# Patient Record
Sex: FEMALE | Race: WHITE | Employment: UNEMPLOYED | URBAN - METROPOLITAN AREA
[De-identification: names, ages, dates, MRNs, and addresses within clinical notes are randomized per-mention and may not be internally consistent; named-entity substitution may affect disease eponyms.]

---

## 2019-09-01 ENCOUNTER — APPOINTMENT (OUTPATIENT)
Dept: ULTRASOUND IMAGING | Age: 54
End: 2019-09-01
Payer: COMMERCIAL

## 2019-09-01 ENCOUNTER — HOSPITAL ENCOUNTER (EMERGENCY)
Age: 54
Discharge: HOME | End: 2019-09-01
Payer: COMMERCIAL

## 2019-09-01 ENCOUNTER — APPOINTMENT (OUTPATIENT)
Dept: GENERAL RADIOLOGY | Age: 54
End: 2019-09-01
Payer: COMMERCIAL

## 2019-09-01 VITALS
OXYGEN SATURATION: 95 % | TEMPERATURE: 97.6 F | SYSTOLIC BLOOD PRESSURE: 123 MMHG | BODY MASS INDEX: 38.09 KG/M2 | WEIGHT: 215 LBS | HEIGHT: 63 IN | DIASTOLIC BLOOD PRESSURE: 111 MMHG | HEART RATE: 94 BPM | RESPIRATION RATE: 16 BRPM

## 2019-09-01 DIAGNOSIS — M25.561 ACUTE PAIN OF RIGHT KNEE: Primary | ICD-10-CM

## 2019-09-01 DIAGNOSIS — M79.661 RIGHT CALF PAIN: ICD-10-CM

## 2019-09-01 PROCEDURE — 93971 EXTREMITY STUDY: CPT

## 2019-09-01 PROCEDURE — 73560 X-RAY EXAM OF KNEE 1 OR 2: CPT

## 2019-09-01 PROCEDURE — 6370000000 HC RX 637 (ALT 250 FOR IP): Performed by: PHYSICIAN ASSISTANT

## 2019-09-01 PROCEDURE — 99284 EMERGENCY DEPT VISIT MOD MDM: CPT

## 2019-09-01 RX ORDER — HYDROCODONE BITARTRATE AND ACETAMINOPHEN 5; 325 MG/1; MG/1
1 TABLET ORAL ONCE
Status: COMPLETED | OUTPATIENT
Start: 2019-09-01 | End: 2019-09-01

## 2019-09-01 RX ADMIN — HYDROCODONE BITARTRATE AND ACETAMINOPHEN 1 TABLET: 5; 325 TABLET ORAL at 15:11

## 2019-09-01 ASSESSMENT — PAIN DESCRIPTION - ORIENTATION: ORIENTATION: RIGHT

## 2019-09-01 ASSESSMENT — PAIN DESCRIPTION - LOCATION: LOCATION: KNEE

## 2019-09-01 ASSESSMENT — PAIN SCALES - GENERAL
PAINLEVEL_OUTOF10: 8
PAINLEVEL_OUTOF10: 8

## 2019-09-01 ASSESSMENT — PAIN DESCRIPTION - PAIN TYPE: TYPE: ACUTE PAIN

## 2019-09-01 NOTE — ED PROVIDER NOTES
Take 1 tablet by mouth every 6 hours as needed for Pain or Fever 60 tablet 0    ondansetron (ZOFRAN ODT) 4 MG disintegrating tablet Take 1 tablet by mouth every 8 hours as needed for Nausea 15 tablet 0    famotidine (PEPCID) 20 MG tablet Take 1 tablet by mouth 2 times daily 60 tablet 3    omeprazole (PRILOSEC) 20 MG delayed release capsule Take 1 capsule by mouth daily 30 capsule 0    dicyclomine (BENTYL) 10 MG capsule Take 1 capsule by mouth 3 times daily As needed for abdominal pain 15 capsule 3    Compression Stockings MISC by Does not apply route Do not wear for more than 8 hours per day. Please fit and supply thigh high compression stockings 1 each 0    HYDROcodone-acetaminophen (NORCO) 5-325 MG per tablet Take 1 tablet by mouth every 6 hours as needed for Pain 15 tablet 0    gabapentin (NEURONTIN) 300 MG capsule Take two tablets po tid (Patient taking differently: 400 mg Take two tablets po tid) 90 capsule 0    apixaban (ELIQUIS) 5 MG TABS tablet Take 1 tablet by mouth 2 times daily To be started after completing 7 days of 10 mg PO bid. 30 tablet 0    OLANZapine zydis (ZYPREXA) 5 MG disintegrating tablet Take 5 mg by mouth nightly      clopidogrel (PLAVIX) 75 MG tablet Take 1 tablet by mouth daily 30 tablet 3    busPIRone (BUSPAR) 10 MG tablet Take 10 mg by mouth 2 times daily.  traZODone (DESYREL) 50 MG tablet Take 100 mg by mouth nightly as needed for Sleep.  aspirin 81 MG tablet Take 81 mg by mouth daily.  clopidogrel (PLAVIX) 75 MG tablet Take 75 mg by mouth daily.  citalopram (CELEXA) 20 MG tablet Take 40 mg by mouth daily.  metoprolol (LOPRESSOR) 25 MG tablet Take 25 mg by mouth 2 times daily.            ALLERGIES    Allergies   Allergen Reactions    Darvocet [Propoxyphene N-Acetaminophen] Hives    Demerol Hives    Toradol [Ketorolac Tromethamine] Hives    Ultram [Tramadol] Hives       SOCIAL & FAMILY HISTORY    Social History     Socioeconomic History    Marital knee and calf tenderness. -ROM:  Extension - Has full extension (Extensor mechanism intact)    Flexion - Mildly limited due pain   -Provocative tests: Posterior and anterior drawer test. No varus / valgus laxity. Tenderness to right calf otherwise no swelling, discoloration, tenderness to palpation of lower leg, or range of motion deficit of the ipsilateral hip and ankle  Vascular: Distal pulses (DP, PT) intact on affected side. Capillary refill intact. Integument:  Well hydrated, no rash   Neurologic:  Awake and alert, normal flow of speech. Distal sensation, motor intact. Psychiatric: Cooperative, pleasant affect        RADIOLOGY/PROCEDURES    XR KNEE RIGHT (1-2 VIEWS)   Final Result   1. No acute osseous abnormality of the right knee identified. VL DUP LOWER EXTREMITY VENOUS RIGHT   Final Result   No evidence of DVT in the right lower extremity. ED COURSE & MEDICAL DECISION MAKING      Patient presents as above. No external signs of infection. Patient provided Cedrick Plunk for pain. Distal sensation and pulses intact. Right knee x-ray shows no acute osseous abnormality. Right lower extremity is negative for DVT. I discussed unclear etiology of right knee, calf pain. Possibly muscular strain, also discussed possibility of internal derangement right knee. I recommend rest, ice, compress, elevation. Patient provided Ace wrap. Patient declines crutches. Recommend follow-up with primary care in 3 days for recheck. I recommend over-the-counter Tylenol for pain. Clinical  IMPRESSION    1. Acute pain of right knee    2. Right calf pain        Diagnosis and plan discussed in detail with patient who understands and agrees. Patient agrees to return emergency department if symptoms worsen or any new symptoms develop.       Comment: Please note this report has been produced using speech recognition software and may contain errors related to that system including errors in grammar,

## 2019-10-06 ENCOUNTER — APPOINTMENT (OUTPATIENT)
Dept: GENERAL RADIOLOGY | Age: 54
End: 2019-10-06
Payer: COMMERCIAL

## 2019-10-06 ENCOUNTER — HOSPITAL ENCOUNTER (EMERGENCY)
Age: 54
Discharge: HOME | End: 2019-10-06
Attending: EMERGENCY MEDICINE
Payer: COMMERCIAL

## 2019-10-06 VITALS
BODY MASS INDEX: 38.45 KG/M2 | HEIGHT: 63 IN | DIASTOLIC BLOOD PRESSURE: 77 MMHG | SYSTOLIC BLOOD PRESSURE: 119 MMHG | RESPIRATION RATE: 18 BRPM | HEART RATE: 78 BPM | TEMPERATURE: 97.6 F | OXYGEN SATURATION: 98 % | WEIGHT: 217 LBS

## 2019-10-06 DIAGNOSIS — M25.511 ACUTE PAIN OF RIGHT SHOULDER: ICD-10-CM

## 2019-10-06 DIAGNOSIS — S40.011A CONTUSION OF RIGHT SHOULDER, INITIAL ENCOUNTER: Primary | ICD-10-CM

## 2019-10-06 PROCEDURE — 6370000000 HC RX 637 (ALT 250 FOR IP): Performed by: EMERGENCY MEDICINE

## 2019-10-06 PROCEDURE — 99283 EMERGENCY DEPT VISIT LOW MDM: CPT

## 2019-10-06 PROCEDURE — 73030 X-RAY EXAM OF SHOULDER: CPT

## 2019-10-06 RX ORDER — IBUPROFEN 400 MG/1
600 TABLET ORAL ONCE
Status: DISCONTINUED | OUTPATIENT
Start: 2019-10-06 | End: 2019-10-06

## 2019-10-06 RX ORDER — ACETAMINOPHEN 325 MG/1
1000 TABLET ORAL ONCE
Status: COMPLETED | OUTPATIENT
Start: 2019-10-06 | End: 2019-10-06

## 2019-10-06 RX ADMIN — ACETAMINOPHEN 975 MG: 325 TABLET ORAL at 14:20

## 2019-10-06 ASSESSMENT — PAIN DESCRIPTION - PAIN TYPE: TYPE: ACUTE PAIN

## 2019-10-06 ASSESSMENT — PAIN SCALES - GENERAL
PAINLEVEL_OUTOF10: 8
PAINLEVEL_OUTOF10: 8

## 2019-10-06 ASSESSMENT — PAIN DESCRIPTION - LOCATION: LOCATION: SHOULDER

## 2020-04-07 ENCOUNTER — APPOINTMENT (OUTPATIENT)
Dept: ULTRASOUND IMAGING | Age: 55
End: 2020-04-07
Payer: COMMERCIAL

## 2020-04-07 ENCOUNTER — HOSPITAL ENCOUNTER (EMERGENCY)
Age: 55
Discharge: HOME | End: 2020-04-07
Attending: EMERGENCY MEDICINE
Payer: COMMERCIAL

## 2020-04-07 VITALS
WEIGHT: 189 LBS | BODY MASS INDEX: 33.49 KG/M2 | HEIGHT: 63 IN | SYSTOLIC BLOOD PRESSURE: 146 MMHG | HEART RATE: 82 BPM | RESPIRATION RATE: 16 BRPM | TEMPERATURE: 98.1 F | OXYGEN SATURATION: 95 % | DIASTOLIC BLOOD PRESSURE: 72 MMHG

## 2020-04-07 PROCEDURE — 6370000000 HC RX 637 (ALT 250 FOR IP): Performed by: EMERGENCY MEDICINE

## 2020-04-07 PROCEDURE — 93971 EXTREMITY STUDY: CPT

## 2020-04-07 PROCEDURE — 99283 EMERGENCY DEPT VISIT LOW MDM: CPT

## 2020-04-07 RX ORDER — SERTRALINE HYDROCHLORIDE 100 MG/1
100 TABLET, FILM COATED ORAL DAILY
COMMUNITY

## 2020-04-07 RX ORDER — BACLOFEN 10 MG/1
10 TABLET ORAL 2 TIMES DAILY
COMMUNITY
End: 2020-09-01

## 2020-04-07 RX ORDER — CILOSTAZOL 50 MG/1
50 TABLET ORAL DAILY
COMMUNITY

## 2020-04-07 RX ORDER — HYDROCODONE BITARTRATE AND ACETAMINOPHEN 5; 325 MG/1; MG/1
2 TABLET ORAL ONCE
Status: COMPLETED | OUTPATIENT
Start: 2020-04-07 | End: 2020-04-07

## 2020-04-07 RX ORDER — HYDROCODONE BITARTRATE AND ACETAMINOPHEN 5; 325 MG/1; MG/1
1 TABLET ORAL EVERY 4 HOURS PRN
Qty: 18 TABLET | Refills: 0 | Status: SHIPPED | OUTPATIENT
Start: 2020-04-07 | End: 2020-04-10

## 2020-04-07 RX ADMIN — HYDROCODONE BITARTRATE AND ACETAMINOPHEN 2 TABLET: 5; 325 TABLET ORAL at 13:59

## 2020-04-07 ASSESSMENT — PAIN DESCRIPTION - LOCATION: LOCATION: LEG;HIP

## 2020-04-07 ASSESSMENT — PAIN DESCRIPTION - PAIN TYPE: TYPE: ACUTE PAIN

## 2020-04-07 ASSESSMENT — PAIN DESCRIPTION - ORIENTATION: ORIENTATION: RIGHT

## 2020-04-07 ASSESSMENT — PAIN SCALES - GENERAL: PAINLEVEL_OUTOF10: 9

## 2020-04-07 NOTE — ED PROVIDER NOTES
No dysuria. No discharge. MSK: Right calf pain as above. No joint pain or swelling. No lower extremity swelling. Does have chronic low back pain, this is unchanged today. Skin:  No rashes. No pruritis. Neuro:  No numbness, tingling or weakness in any extremity or face. No headache. No incoordination. No speech difficulties. No seizures. At least 10 point systems reviewed and otherwise acutely negative except as in the [address]     Past Medical History:   Diagnosis Date    [facility]     Hypertension     Kidney stone     PAD [facility]peripheral artery disease) ([facility] [address] 10/26/2015     Past Surgical History:   Procedure Laterality Date    LEG SURGERY      stent    OVARIAN CYST SURGERY      TUBAL LIGATION      VASCULAR SURGERY Bilateral      Family History   Problem Relation Age of Onset    Heart Disease Mother     Heart Disease Father     Cancer Father      Social History     Socioeconomic History    Marital status:      Spouse name: Ritika Redding Number of children: 2    Years of education: Not on file    Highest education level: Not on file   Occupational History    Not on file   Social Needs    Financial resource strain: Not on file    Food insecurity     Worry: Not on file     Inability: Not on file   [employer] needs     Medical: Not on file     Non-medical: Not on file   Tobacco Use    Smoking status: Former Smoker     Packs/day: 1.00     Years: 30.00     Pack years: 30.00     Types: Cigarettes     Last attempt to quit: 2019     Years since quittin.6    Smokeless tobacco: Never Used   Substance and Sexual Activity    Alcohol use: No    Drug use: No     Types: Marijuana    Sexual activity: Yes     Partners: Male   Lifestyle    Physical activity     Days per week: Not on file     Minutes per session: Not on file    Stress: Not on file   Relationships    Social connections     Talks on phone: Not on file     Gets together: Not on file     Attends Restoration service: Not on file     Active member of club or organization: Not on file     Attends meetings of clubs or organizations: Not on file     Relationship status: Not on file    Intimate partner violence     Fear of current or ex partner: Not on file     Emotionally abused: Not on file     Physically abused: Not on file     Forced sexual activity: Not on file   Other Topics Concern    Not on file   Social History Narrative    Not on file     No current facility-administered medications for this encounter. Current Outpatient Medications   Medication Sig Dispense Refill    raNITIdine HCl (RANITIDINE 75 PO) Take by mouth      baclofen (LIORESAL) 10 MG tablet Take 10 mg by mouth 2 times daily      cilostazol (PLETAL) 50 MG tablet Take 50 mg by mouth daily      sertraline (ZOLOFT) 100 MG tablet Take 100 mg by mouth daily      HYDROcodone-acetaminophen (LORCET) 5-325 MG per tablet Take 1 tablet by mouth every 4 hours as needed for Pain for up to 3 days. Intended supply: 3 days. Take lowest dose possible to manage pain 18 tablet 0    Compression Stockings MISC by Does not apply route Do not wear for more than 8 hours per day. Please fit and supply thigh high compression stockings 1 each 0    gabapentin (NEURONTIN) 300 MG capsule Take two tablets po tid (Patient taking differently: Take 600 mg by mouth 3 times daily. Take two tablets po tid) 90 capsule 0    apixaban (ELIQUIS) 5 MG TABS tablet Take 1 tablet by mouth 2 times daily To be started after completing 7 days of 10 mg PO bid. 30 tablet 0    OLANZapine zydis (ZYPREXA) 5 MG disintegrating tablet Take 5 mg by mouth nightly      clopidogrel (PLAVIX) 75 MG tablet Take 1 tablet by mouth daily 30 tablet 3    busPIRone (BUSPAR) 10 MG tablet Take 15 mg by mouth 2 times daily       traZODone (DESYREL) 50 MG tablet Take 100 mg by mouth nightly as needed for Sleep.  aspirin 81 MG tablet Take 81 mg by mouth daily.       metoprolol (LOPRESSOR) 25 results found for this visit on 04/07/20. Radiographs:  [] Radiologist's Report Reviewed:   VL DUP LOWER EXTREMITY VENOUS RIGHT   Preliminary Result   No evidence of DVT in the right lower extremity. EKG: (All EKG's are interpreted by myself in the absence of a cardiologist)    MDM:  2 PM patient concern for recurrent DVT. Currently is anticoagulated. Otherwise exam is normal as above. Will call and ultrasonographer to obtain a duplex of the right lower extremity. Otherwise leg is warm, pulses are symmetric, compartments are soft. I have a low index of suspicion for infection, compartment syndrome, arterial occlusion. 3:14 PM patient states that her pain is markedly improved, she actually told me she dosed off a little bit and got some sleep. Reexamination of her extremities is unchanged, strong DP and PT pulses, compartments are soft, leg is cool and symmetric to the other. Ultrasound is negative for DVT. Given her history of vascular stents I did discuss the need for her to follow-up closely with her vascular surgeon, I would like her to do this in the next 48 to 72 hours, in the interim she will be given pain management with hydrocodone, 3 days worth of a prescription, I did review her drug monitoring program report and she has not had recent pain prescriptions. Otherwise if she is worse in any way she will return to the ER immediately. Final Impression:  1. Right leg pain        Discharge referral (if applicable):  Toya German, [address] [place] [address]  [phone]    In 2 days        Discharge medications (if applicable):  New Prescriptions    HYDROCODONE-ACETAMINOPHEN (LORCET) 5-325 MG PER TABLET    Take 1 tablet by mouth every 4 hours as needed for Pain for up to 3 days. Intended supply: 3 days.  Take lowest dose possible to manage pain       (Please note that portions of this note may have been completed with a voice recognition program. Efforts were made to edit the

## 2020-04-19 ENCOUNTER — HOSPITAL ENCOUNTER (EMERGENCY)
Age: 55
Discharge: LEFT BEFORE DISCHARGE | End: 2020-04-19
Attending: EMERGENCY MEDICINE
Payer: COMMERCIAL

## 2020-04-19 ENCOUNTER — APPOINTMENT (OUTPATIENT)
Dept: ULTRASOUND IMAGING | Age: 55
End: 2020-04-19
Payer: COMMERCIAL

## 2020-04-19 VITALS
RESPIRATION RATE: 16 BRPM | DIASTOLIC BLOOD PRESSURE: 80 MMHG | OXYGEN SATURATION: 96 % | BODY MASS INDEX: 33.13 KG/M2 | SYSTOLIC BLOOD PRESSURE: 129 MMHG | HEIGHT: 63 IN | WEIGHT: 187 LBS | TEMPERATURE: 98.6 F | HEART RATE: 79 BPM

## 2020-04-19 PROCEDURE — 6370000000 HC RX 637 (ALT 250 FOR IP): Performed by: EMERGENCY MEDICINE

## 2020-04-19 PROCEDURE — 99283 EMERGENCY DEPT VISIT LOW MDM: CPT

## 2020-04-19 RX ORDER — ACETAMINOPHEN 500 MG
1000 TABLET ORAL ONCE
Status: COMPLETED | OUTPATIENT
Start: 2020-04-19 | End: 2020-04-19

## 2020-04-19 RX ADMIN — ACETAMINOPHEN 1000 MG: 500 TABLET, FILM COATED ORAL at 16:08

## 2020-04-19 ASSESSMENT — PAIN SCALES - GENERAL
PAINLEVEL_OUTOF10: 8
PAINLEVEL_OUTOF10: 8

## 2020-04-19 NOTE — ED NOTES
Patient walked to nurses station and stated that she has to leave because of a family member may be injured and she needs to go.   Patient advised of risks and that she can come back for further eval.      Surinder Holman RN  04/19/20 [id number]

## 2020-04-19 NOTE — ED PROVIDER NOTES
eMERGENCY dEPARTMENT eNCOUnter      PCP: Gunnar Dobson, [address] [facility]    Chief Complaint   Patient presents with    Leg Pain     right leg pain for 2 days       HPI    Judi Finn is a 50 y.o. female who presents with right leg pain. States symptoms present since Friday. States gradually worsening. Reports that it feels like a cramping sensation in her calf and her upper thigh. States that it occurs when she walks certain distances. States it resolves when she rests. States she does have history of peripheral arterial disease, also has history of DVT. She is taking Eliquis. She denies any recent immobilization. No recent injury. REVIEW OF SYSTEMS    General: Denies fever or chills  Cardiac: Denies chest pain  Pulmonary: Denies shortness of breath  GI: Denies abdominal pain, vomiting, or diarrhea  Skin: No rash, abrasions/lacerations      All other review of systems are negative  See HPI and nursing notes for additional information     PAST MEDICAL & SURGICAL HISTORY    Past Medical History:   Diagnosis Date    DVT (deep venous thrombosis) ([facility] [address]     Hypertension     Kidney stone     PAD (peripheral artery disease) ([facility] [address] 10/26/2015     Past Surgical History:   Procedure Laterality Date    LEG SURGERY      stent    OVARIAN CYST SURGERY      TUBAL LIGATION      VASCULAR SURGERY Bilateral        CURRENT MEDICATIONS    Current Outpatient Rx   Medication Sig Dispense Refill    raNITIdine HCl (RANITIDINE 75 PO) Take by mouth      baclofen (LIORESAL) 10 MG tablet Take 10 mg by mouth 2 times daily      cilostazol (PLETAL) 50 MG tablet Take 50 mg by mouth daily      sertraline (ZOLOFT) 100 MG tablet Take 100 mg by mouth daily      ondansetron (ZOFRAN ODT) 4 MG disintegrating tablet Take 1 tablet by mouth every 8 hours as needed for Nausea 15 tablet 0    Compression Stockings MISC by Does not apply route Do not wear for more than 8 hours per day.  Please fit and supply thigh high compression stockings 1 each 0    gabapentin (NEURONTIN) 300 MG capsule Take two tablets po tid (Patient taking differently: Take 600 mg by mouth 3 times daily. Take two tablets po tid) 90 capsule 0    apixaban (ELIQUIS) 5 MG TABS tablet Take 1 tablet by mouth 2 times daily To be started after completing 7 days of 10 mg PO bid. 30 tablet 0    OLANZapine zydis (ZYPREXA) 5 MG disintegrating tablet Take 5 mg by mouth nightly      clopidogrel (PLAVIX) 75 MG tablet Take 1 tablet by mouth daily 30 tablet 3    busPIRone (BUSPAR) 10 MG tablet Take 15 mg by mouth 2 times daily       traZODone (DESYREL) 50 MG tablet Take 100 mg by mouth nightly as needed for Sleep.  aspirin 81 MG tablet Take 81 mg by mouth daily.  clopidogrel (PLAVIX) 75 MG tablet Take 75 mg by mouth daily.  metoprolol (LOPRESSOR) 25 MG tablet Take 25 mg by mouth 2 times daily.            ALLERGIES    Allergies   Allergen Reactions    Darvocet [Propoxyphene N-Acetaminophen] Hives    Demerol Hives    Toradol [Ketorolac Tromethamine] Hives    Ultram [Tramadol] Hives       SOCIAL & FAMILY HISTORY    Social History     Socioeconomic History    Marital status:      Spouse name: Diana Drew Number of children: 2    Years of education: Not on file    Highest education level: Not on file   Occupational History    Not on file   Social Needs    Financial resource strain: Not on file    Food insecurity     Worry: Not on file     Inability: Not on file   [employer] needs     Medical: Not on file     Non-medical: Not on file   Tobacco Use    Smoking status: Former Smoker     Packs/day: 1.00     Years: 30.00     Pack years: 30.00     Types: Cigarettes     Last attempt to quit: 2019     Years since quittin.6    Smokeless tobacco: Never Used   Substance and Sexual Activity    Alcohol use: No    Drug use: No     Types: Marijuana    Sexual activity: Yes     Partners: Male   Lifestyle    Physical activity     Days per week: Not on file     Minutes per session: Not on file    Stress: Not on file   Relationships    Social connections     Talks on phone: Not on file     Gets together: Not on file     Attends [employer]ist service: Not on file     Active member of club or organization: Not on file     Attends meetings of clubs or organizations: Not on file     Relationship status: Not on file    Intimate partner violence     Fear of current or ex partner: Not on file     Emotionally abused: Not on file     Physically abused: Not on file     Forced sexual activity: Not on file   Other Topics Concern    Not on file   Social History Narrative    Not on file     Family History   Problem Relation Age of Onset    Heart Disease Mother     Heart Disease Father     Cancer Father            PHYSICAL EXAM    VITAL SIGNS: /80   Pulse 79   Temp 98.6 °F (37 °C) (Oral)   Resp 16   Ht 5' 3\" (1.6 m)   Wt 187 lb (84.8 kg)   LMP 01/28/2013   SpO2 96%   BMI 33.13 kg/m²   Constitutional:  Well developed, well nourished, no acute distress, non-toxic appearance   HENT:  Atraumatic, normocephalic  Musculoskeletal:   Mild tenderness over the right lower leg. Coloration is normal.  There is no semen, warmth, induration of the tissues. The compartments are soft. Distal pulses, DP and PT are intact. Distal cap refill is brisk. Sensation and motor function are intact. Remaining exam reveals active ROM of  joints without ligamentous laxity. Theres no obvious joint or bony deformity. Abdomen: Soft nontender. Integument:  Well hydrated, no rash. Skin intact  Neurologic:  Awake alert, normal flow of speech. No focal deficits noted. Psychiatric: Cooperative, pleasant affect      ED COURSE & MEDICAL DECISION MAKING       Vital signs and nursing notes reviewed during ED course. I have independently evaluated this patient . All pertinent Lab data and radiographic results reviewed with patient at bedside.        The patient and/or the family were informed of the results of any tests/labs/imaging, the treatment plan, and time was allotted to answer questions. This is a 82-year-old female who presented with leg pain. She did report history of DVT, thus ultrasound was ordered. While awaiting ultrasound to arrive she stated that her brother had been injured and she needed to leave. We did instruct her if any point she wants to return for her ultrasound she is invited. Clinical  IMPRESSION    Leg pain      Diagnosis and plan discussed in detail with patient who understands and agrees. Patient agrees to return emergency department if symptoms worsen or any new symptoms develop.       (Please note the MDM and HPI sections of this note were completed with a voice recognition program.  Efforts were made to edit the dictations but occasionally words are mis-transcribed.)      Lanre Bhagat DO  04/19/20 [id number]

## 2020-06-09 ENCOUNTER — APPOINTMENT (OUTPATIENT)
Dept: CT IMAGING | Age: 55
End: 2020-06-09
Payer: COMMERCIAL

## 2020-06-09 ENCOUNTER — HOSPITAL ENCOUNTER (EMERGENCY)
Age: 55
Discharge: HOME | End: 2020-06-09
Attending: EMERGENCY MEDICINE
Payer: COMMERCIAL

## 2020-06-09 VITALS
HEIGHT: 63 IN | BODY MASS INDEX: 35.44 KG/M2 | OXYGEN SATURATION: 99 % | WEIGHT: 200 LBS | TEMPERATURE: 99.1 F | SYSTOLIC BLOOD PRESSURE: 113 MMHG | RESPIRATION RATE: 15 BRPM | HEART RATE: 81 BPM | DIASTOLIC BLOOD PRESSURE: 63 MMHG

## 2020-06-09 LAB
ALBUMIN SERPL-MCNC: 3.7 GM/DL (ref 3.4–5)
ALP BLD-CCNC: 160 IU/L (ref 40–129)
ALT SERPL-CCNC: 12 U/L (ref 10–40)
ANION GAP SERPL CALCULATED.3IONS-SCNC: 8 MMOL/L (ref 4–16)
AST SERPL-CCNC: 12 IU/L (ref 15–37)
BASOPHILS ABSOLUTE: 0 K/CU MM
BASOPHILS RELATIVE PERCENT: 0.3 % (ref 0–1)
BILIRUB SERPL-MCNC: 0.3 MG/DL (ref 0–1)
BUN BLDV-MCNC: 8 MG/DL (ref 6–23)
CALCIUM SERPL-MCNC: 8.8 MG/DL (ref 8.3–10.6)
CHLORIDE BLD-SCNC: 104 MMOL/L (ref 99–110)
CO2: 24 MMOL/L (ref 21–32)
CREAT SERPL-MCNC: 0.7 MG/DL (ref 0.6–1.1)
DIFFERENTIAL TYPE: ABNORMAL
EOSINOPHILS ABSOLUTE: 0.2 K/CU MM
EOSINOPHILS RELATIVE PERCENT: 2.6 % (ref 0–3)
GFR AFRICAN AMERICAN: >60 ML/MIN/1.73M2
GFR NON-AFRICAN AMERICAN: >60 ML/MIN/1.73M2
GLUCOSE BLD-MCNC: 115 MG/DL (ref 70–99)
HCT VFR BLD CALC: 41.1 % (ref 37–47)
HEMOGLOBIN: 13.2 GM/DL (ref 12.5–16)
IMMATURE NEUTROPHIL %: 0.2 % (ref 0–0.43)
LYMPHOCYTES ABSOLUTE: 1.9 K/CU MM
LYMPHOCYTES RELATIVE PERCENT: 22 % (ref 24–44)
MCH RBC QN AUTO: 29.7 PG (ref 27–31)
MCHC RBC AUTO-ENTMCNC: 32.1 % (ref 32–36)
MCV RBC AUTO: 92.6 FL (ref 78–100)
MONOCYTES ABSOLUTE: 0.4 K/CU MM
MONOCYTES RELATIVE PERCENT: 5.1 % (ref 0–4)
PDW BLD-RTO: 13.2 % (ref 11.7–14.9)
PLATELET # BLD: 239 K/CU MM (ref 140–440)
PMV BLD AUTO: 9.7 FL (ref 7.5–11.1)
POTASSIUM SERPL-SCNC: 4.1 MMOL/L (ref 3.5–5.1)
RBC # BLD: 4.44 M/CU MM (ref 4.2–5.4)
SEGMENTED NEUTROPHILS ABSOLUTE COUNT: 6 K/CU MM
SEGMENTED NEUTROPHILS RELATIVE PERCENT: 69.8 % (ref 36–66)
SODIUM BLD-SCNC: 136 MMOL/L (ref 135–145)
TOTAL IMMATURE NEUTOROPHIL: 0.02 K/CU MM
TOTAL PROTEIN: 6.8 GM/DL (ref 6.4–8.2)
WBC # BLD: 8.6 K/CU MM (ref 4–10.5)

## 2020-06-09 PROCEDURE — 96376 TX/PRO/DX INJ SAME DRUG ADON: CPT

## 2020-06-09 PROCEDURE — 85025 COMPLETE CBC W/AUTO DIFF WBC: CPT

## 2020-06-09 PROCEDURE — 96374 THER/PROPH/DIAG INJ IV PUSH: CPT

## 2020-06-09 PROCEDURE — 6360000004 HC RX CONTRAST MEDICATION: Performed by: EMERGENCY MEDICINE

## 2020-06-09 PROCEDURE — 99284 EMERGENCY DEPT VISIT MOD MDM: CPT

## 2020-06-09 PROCEDURE — 71275 CT ANGIOGRAPHY CHEST: CPT

## 2020-06-09 PROCEDURE — 6360000002 HC RX W HCPCS: Performed by: EMERGENCY MEDICINE

## 2020-06-09 PROCEDURE — 80053 COMPREHEN METABOLIC PANEL: CPT

## 2020-06-09 PROCEDURE — 96375 TX/PRO/DX INJ NEW DRUG ADDON: CPT

## 2020-06-09 RX ORDER — ONDANSETRON 2 MG/ML
INJECTION INTRAMUSCULAR; INTRAVENOUS
Status: DISCONTINUED
Start: 2020-06-09 | End: 2020-06-09 | Stop reason: HOSPADM

## 2020-06-09 RX ORDER — IBUPROFEN 800 MG/1
800 TABLET ORAL EVERY 8 HOURS PRN
Qty: 24 TABLET | Refills: 0 | Status: SHIPPED | OUTPATIENT
Start: 2020-06-09 | End: 2020-07-29

## 2020-06-09 RX ORDER — MORPHINE SULFATE 4 MG/ML
4 INJECTION, SOLUTION INTRAMUSCULAR; INTRAVENOUS ONCE
Status: COMPLETED | OUTPATIENT
Start: 2020-06-09 | End: 2020-06-09

## 2020-06-09 RX ORDER — ONDANSETRON 2 MG/ML
4 INJECTION INTRAMUSCULAR; INTRAVENOUS ONCE
Status: COMPLETED | OUTPATIENT
Start: 2020-06-09 | End: 2020-06-09

## 2020-06-09 RX ORDER — HYDROCODONE BITARTRATE AND ACETAMINOPHEN 5; 325 MG/1; MG/1
1 TABLET ORAL EVERY 6 HOURS PRN
Qty: 15 TABLET | Refills: 0 | Status: SHIPPED | OUTPATIENT
Start: 2020-06-09 | End: 2020-06-14

## 2020-06-09 RX ORDER — MORPHINE SULFATE 4 MG/ML
INJECTION, SOLUTION INTRAMUSCULAR; INTRAVENOUS
Status: DISCONTINUED
Start: 2020-06-09 | End: 2020-06-09 | Stop reason: HOSPADM

## 2020-06-09 RX ADMIN — MORPHINE SULFATE 4 MG: 4 INJECTION, SOLUTION INTRAMUSCULAR; INTRAVENOUS at 14:21

## 2020-06-09 RX ADMIN — MORPHINE SULFATE 4 MG: 4 INJECTION, SOLUTION INTRAMUSCULAR; INTRAVENOUS at 15:36

## 2020-06-09 RX ADMIN — IOPAMIDOL 100 ML: 755 INJECTION, SOLUTION INTRAVENOUS at 15:09

## 2020-06-09 RX ADMIN — ONDANSETRON 4 MG: 2 INJECTION INTRAMUSCULAR; INTRAVENOUS at 14:21

## 2020-06-09 ASSESSMENT — PAIN SCALES - GENERAL
PAINLEVEL_OUTOF10: 7
PAINLEVEL_OUTOF10: 5
PAINLEVEL_OUTOF10: 4

## 2020-06-09 ASSESSMENT — PAIN DESCRIPTION - PAIN TYPE
TYPE: ACUTE PAIN
TYPE: ACUTE PAIN

## 2020-06-09 ASSESSMENT — PAIN DESCRIPTION - ORIENTATION
ORIENTATION: LEFT;UPPER
ORIENTATION: LEFT;UPPER

## 2020-06-09 ASSESSMENT — PAIN DESCRIPTION - FREQUENCY
FREQUENCY: CONTINUOUS
FREQUENCY: CONTINUOUS

## 2020-06-09 ASSESSMENT — PAIN DESCRIPTION - DESCRIPTORS: DESCRIPTORS: STABBING

## 2020-06-09 NOTE — ED PROVIDER NOTES
for review. Dose modulation, iterative reconstruction,  and/or weight based adjustment of the mA/kV was utilized to reduce the  radiation dose to as low as reasonably achievable. COMPARISON:  10/18/2015    HISTORY:  ORDERING SYSTEM PROVIDED HISTORY: Pleuritic right upper back pain, history of  DVT  TECHNOLOGIST PROVIDED HISTORY:  Reason for exam:->Pleuritic right upper back pain, history of DVT    FINDINGS:  Pulmonary Arteries: Pulmonary arteries are adequately opacified for  evaluation.  No evidence of intraluminal filling defect to suggest pulmonary  embolism.  Main pulmonary artery is normal in caliber. Mediastinum: No evidence of mediastinal lymphadenopathy.  The heart and  pericardium demonstrate no acute abnormality.  There is no acute abnormality  of the thoracic aorta. Lungs/pleura: Numerous irregular masslike opacities seen in both lungs.  1  seen medially in the left lower lobe measures 18 mm.  Multiple other nodules  are seen in the left lower lobe.  1 seen medially in the left upper lobe  measures 16 mm.  The largest is in the right upper lobe and measures 3.5 cm. Upper Abdomen: Limited images of the upper abdomen are unremarkable. Soft Tissues/Bones: No acute bone or soft tissue abnormality. Labs Reviewed   CBC WITH AUTO DIFFERENTIAL - Abnormal; Notable for the following components:       Result Value    Segs Relative 69.8 (*)     Lymphocytes % 22.0 (*)     Monocytes % 5.1 (*)     All other components within normal limits   COMPREHENSIVE METABOLIC PANEL - Abnormal; Notable for the following components:    Glucose 115 (*)     AST 12 (*)     Alkaline Phosphatase 160 (*)     All other components within normal limits       ED COURSE & MEDICAL DECISION MAKING:  Pertinent Labs & Imaging studies reviewed. (See chart for details)  On exam, the patient is afebrile and nontoxic appearing. She is hemodynamically stable and neurologically intact.  EKG shows a normal sinus rhythm with no

## 2020-06-15 PROBLEM — R91.1 PULMONARY NODULE: Status: ACTIVE | Noted: 2020-06-15

## 2020-06-16 ENCOUNTER — ANESTHESIA EVENT (OUTPATIENT)
Dept: ENDOSCOPY | Age: 55
End: 2020-06-16
Payer: COMMERCIAL

## 2020-06-19 ASSESSMENT — LIFESTYLE VARIABLES: SMOKING_STATUS: 1

## 2020-06-19 NOTE — ANESTHESIA PRE PROCEDURE
Provider, MD   aspirin 81 MG tablet Take 81 mg by mouth daily. Historical Provider, MD   clopidogrel (PLAVIX) 75 MG tablet Take 75 mg by mouth daily. Historical Provider, MD   metoprolol (LOPRESSOR) 25 MG tablet Take 25 mg by mouth 2 times daily. Historical Provider, MD       Current medications:    Current Outpatient Medications   Medication Sig Dispense Refill    ibuprofen (ADVIL;MOTRIN) 800 MG tablet Take 1 tablet by mouth every 8 hours as needed for Pain 24 tablet 0    raNITIdine HCl (RANITIDINE 75 PO) Take by mouth      baclofen (LIORESAL) 10 MG tablet Take 10 mg by mouth 2 times daily      cilostazol (PLETAL) 50 MG tablet Take 50 mg by mouth daily      sertraline (ZOLOFT) 100 MG tablet Take 100 mg by mouth daily      ondansetron (ZOFRAN ODT) 4 MG disintegrating tablet Take 1 tablet by mouth every 8 hours as needed for Nausea 15 tablet 0    Compression Stockings MISC by Does not apply route Do not wear for more than 8 hours per day. Please fit and supply thigh high compression stockings 1 each 0    gabapentin (NEURONTIN) 300 MG capsule Take two tablets po tid (Patient taking differently: Take 600 mg by mouth 3 times daily. Take two tablets po tid) 90 capsule 0    apixaban (ELIQUIS) 5 MG TABS tablet Take 1 tablet by mouth 2 times daily To be started after completing 7 days of 10 mg PO bid. 30 tablet 0    OLANZapine zydis (ZYPREXA) 5 MG disintegrating tablet Take 5 mg by mouth nightly      clopidogrel (PLAVIX) 75 MG tablet Take 1 tablet by mouth daily 30 tablet 3    busPIRone (BUSPAR) 10 MG tablet Take 15 mg by mouth 2 times daily       traZODone (DESYREL) 50 MG tablet Take 100 mg by mouth nightly as needed for Sleep.  aspirin 81 MG tablet Take 81 mg by mouth daily.  clopidogrel (PLAVIX) 75 MG tablet Take 75 mg by mouth daily.  metoprolol (LOPRESSOR) 25 MG tablet Take 25 mg by mouth 2 times daily. No current facility-administered medications for this encounter. Allergies: Allergies   Allergen Reactions    Darvocet [Propoxyphene N-Acetaminophen] Hives    Demerol Hives    Toradol [Ketorolac Tromethamine] Hives    Ultram [Tramadol] Hives       Problem List:    Patient Active Problem List   Diagnosis Code    DVT (deep vein thrombosis) in pregnancy O22.30    DVT (deep venous thrombosis) (HCC) I82.409    Claudication in peripheral vascular disease ([facility] I73.9    Pulmonary nodule R91.1       Past Medical History:        Diagnosis Date    [facility]     Hypertension     Kidney stone     PAD (peripheral artery disease) ([facility] 10/26/2015       Past Surgical History:        Procedure Laterality Date    LEG SURGERY      stent    OVARIAN CYST SURGERY      TUBAL LIGATION      VASCULAR SURGERY Bilateral        Social History:    Social History     Tobacco Use    Smoking status: Current Every Day Smoker     Packs/day: 1.00     Years: 30.00     Pack years: 30.00     Types: Cigarettes    Smokeless tobacco: Never Used   Substance Use Topics    Alcohol use: No                                Ready to quit: Not Answered  Counseling given: Not Answered      Vital Signs (Current): There were no vitals filed for this visit. BP Readings from Last 3 Encounters:   06/09/20 113/63   04/19/20 129/80   04/07/20 (!) 146/72       NPO Status:                                                                                 BMI:   Wt Readings from Last 3 Encounters:   06/09/20 200 lb (90.7 kg)   04/19/20 187 lb (84.8 kg)   04/07/20 189 lb (85.7 kg)     There is no height or weight on file to calculate BMI.       CBC  Lab Results   Component Value Date/Time    WBC 6.5 06/26/2020 11:40 AM    HGB 13.8 06/26/2020 11:40 AM    HCT 42.7 06/26/2020 11:40 AM     06/26/2020 11:40 AM     RENAL  Lab Results   Component Value Date/Time     06/26/2020 11:40 AM    K 4.5 06/26/2020 11:40 AM     06/26/2020 11:40 AM CO2 26 2020 11:40 AM    BUN 12 2020 11:40 AM    CREATININE 0.7 2020 11:40 AM    GLUCOSE 122 (H) 2020 11:40 AM     COAGS  Lab Results   Component Value Date/Time    PROTIME 11.4 (L) 2020 11:40 AM    PROTIME 9.8 (L) 2012 01:25 PM    INR 0.94 2020 11:40 AM    APTT 31.9 2020 11:40 AM       COVID-19 Screening (If Applicable): No results found for: COVID19      Anesthesia Evaluation  Patient summary reviewed and Nursing notes reviewed  Airway:         Dental:          Pulmonary:   (+) current smoker                          ROS comment: multiple bilateral lung lesions. CT lungs 2020  1. Negative for acute pulmonary embolism  2. Numerous masslike irregular nodular opacities throughout both lungs. Although an infectious or inflammatory process could give this appearance, a  neoplastic process needs to be considered and follow-up is suggested to ensure resolution.       smokes 1 pack a day x 25 years. smoker  Counseled on smoking cessation. PAT Airway. Limited exam. COVID 19 precautions. Patient wearing mask  Head/Neck movement:   History of difficult intubation:  None  Teeth: all extracted   Able to lie flat     LUNGS:  No increased work of breathing, good air exchange, clear to auscultation bilaterally, no crackles or wheezing      COVID 19 screening  Denies recent fever or known COVID 19 exposure. Instructed to quarantine until surgery and report any new respiratory or fever symptoms to surgeon. Aware COVID testing must be completed before DOS.  COVID swab:  2020   Cardiovascular:    (+) hypertension (on beta blocker ):,       ECG reviewed                     ROS comment:  CARDIOVASCULAR:  Normal apical impulse, regular rate and rhythm, normal S1 and S2, no S3 or S4, and no murmur noted    CP or SOB: NO   Exercise: no     EK2020  SB, HR 58     Neuro/Psych:   (+) psychiatric history (PTSD, marijuana use. ):depression/anxiety GI/Hepatic/Renal:   (+) GERD: well controlled, renal disease (hx mulitple kidney stones,  last 2016 ): kidney stones,           Endo/Other:              Pt had PAT visit. ROS comment:  Closed fracture of sacrum, 2018 Abdominal:           Vascular:   + PVD, aortic or cerebral (Hx PVD, claudication. s/p aortic stenting w/ kissing iliac stent placement, 2018. S/p anshul common iliac stents, bill external iliac artery stents, 2016. S/p stenting of the left common iliac artery , 2014 On pletal and plavix, last dose 6/22/2020), DVT (2015  provoked after long car trip. US 4/2020 No evidence of DVT in the right lower extremity.), . Anesthesia Plan        Shruthi Contreras, APRN - CNP Chart reviewed, pt. Interviewed and examined. Anesthesia Evaluation will follow by a certified practitioner prior to surgery.   6/19/2020

## 2020-06-24 NOTE — PROGRESS NOTES
6/24/20 - LM of pre-testing on 6/25/20 @ 1400, arrival 1315. Bring insurance card, picture ID, list of medications and wear a mask.

## 2020-06-25 NOTE — PROGRESS NOTES
6/25/20 - LM on patient & spouse's cell of missed pre-testing today, please call asap. Dr. Kelly Antis office notified.

## 2020-06-26 ENCOUNTER — HOSPITAL ENCOUNTER (OUTPATIENT)
Dept: PREADMISSION TESTING | Age: 55
Discharge: HOME | End: 2020-06-30
Payer: COMMERCIAL

## 2020-06-26 VITALS
DIASTOLIC BLOOD PRESSURE: 71 MMHG | WEIGHT: 217.25 LBS | BODY MASS INDEX: 38.49 KG/M2 | TEMPERATURE: 97.1 F | SYSTOLIC BLOOD PRESSURE: 133 MMHG | OXYGEN SATURATION: 95 % | RESPIRATION RATE: 20 BRPM | HEART RATE: 66 BPM | HEIGHT: 63 IN

## 2020-06-26 LAB
ANION GAP SERPL CALCULATED.3IONS-SCNC: 11 MMOL/L (ref 4–16)
APTT: 31.9 SECONDS (ref 25.1–37.1)
BASOPHILS ABSOLUTE: 0 K/CU MM
BASOPHILS RELATIVE PERCENT: 0.6 % (ref 0–1)
BUN BLDV-MCNC: 12 MG/DL (ref 6–23)
CALCIUM SERPL-MCNC: 9.6 MG/DL (ref 8.3–10.6)
CHLORIDE BLD-SCNC: 101 MMOL/L (ref 99–110)
CO2: 26 MMOL/L (ref 21–32)
CREAT SERPL-MCNC: 0.7 MG/DL (ref 0.6–1.1)
DIFFERENTIAL TYPE: ABNORMAL
EKG ATRIAL RATE: 58 BPM
EKG DIAGNOSIS: NORMAL
EKG P AXIS: 41 DEGREES
EKG P-R INTERVAL: 164 MS
EKG Q-T INTERVAL: 444 MS
EKG QRS DURATION: 76 MS
EKG QTC CALCULATION (BAZETT): 435 MS
EKG R AXIS: 11 DEGREES
EKG T AXIS: 33 DEGREES
EKG VENTRICULAR RATE: 58 BPM
EOSINOPHILS ABSOLUTE: 0.1 K/CU MM
EOSINOPHILS RELATIVE PERCENT: 1.7 % (ref 0–3)
GFR AFRICAN AMERICAN: >60 ML/MIN/1.73M2
GFR NON-AFRICAN AMERICAN: >60 ML/MIN/1.73M2
GLUCOSE BLD-MCNC: 122 MG/DL (ref 70–99)
HCT VFR BLD CALC: 42.7 % (ref 37–47)
HEMOGLOBIN: 13.8 GM/DL (ref 12.5–16)
IMMATURE NEUTROPHIL %: 0.3 % (ref 0–0.43)
INR BLD: 0.94 INDEX
LYMPHOCYTES ABSOLUTE: 1.4 K/CU MM
LYMPHOCYTES RELATIVE PERCENT: 22.2 % (ref 24–44)
MCH RBC QN AUTO: 30.1 PG (ref 27–31)
MCHC RBC AUTO-ENTMCNC: 32.3 % (ref 32–36)
MCV RBC AUTO: 93 FL (ref 78–100)
MONOCYTES ABSOLUTE: 0.2 K/CU MM
MONOCYTES RELATIVE PERCENT: 3.7 % (ref 0–4)
NUCLEATED RBC %: 0 %
PDW BLD-RTO: 13.2 % (ref 11.7–14.9)
PLATELET # BLD: 224 K/CU MM (ref 140–440)
PMV BLD AUTO: 10.1 FL (ref 7.5–11.1)
POTASSIUM SERPL-SCNC: 4.5 MMOL/L (ref 3.5–5.1)
PROTHROMBIN TIME: 11.4 SECONDS (ref 11.7–14.5)
RBC # BLD: 4.59 M/CU MM (ref 4.2–5.4)
SEGMENTED NEUTROPHILS ABSOLUTE COUNT: 4.6 K/CU MM
SEGMENTED NEUTROPHILS RELATIVE PERCENT: 71.5 % (ref 36–66)
SODIUM BLD-SCNC: 138 MMOL/L (ref 135–145)
TOTAL IMMATURE NEUTOROPHIL: 0.02 K/CU MM
TOTAL NUCLEATED RBC: 0 K/CU MM
WBC # BLD: 6.5 K/CU MM (ref 4–10.5)

## 2020-06-26 PROCEDURE — 93005 ELECTROCARDIOGRAM TRACING: CPT | Performed by: THORACIC SURGERY (CARDIOTHORACIC VASCULAR SURGERY)

## 2020-06-26 PROCEDURE — 36415 COLL VENOUS BLD VENIPUNCTURE: CPT

## 2020-06-26 PROCEDURE — 93010 ELECTROCARDIOGRAM REPORT: CPT | Performed by: INTERNAL MEDICINE

## 2020-06-26 PROCEDURE — U0002 COVID-19 LAB TEST NON-CDC: HCPCS

## 2020-06-26 PROCEDURE — 85730 THROMBOPLASTIN TIME PARTIAL: CPT

## 2020-06-26 PROCEDURE — 80048 BASIC METABOLIC PNL TOTAL CA: CPT

## 2020-06-26 PROCEDURE — 85025 COMPLETE CBC W/AUTO DIFF WBC: CPT

## 2020-06-26 PROCEDURE — 85610 PROTHROMBIN TIME: CPT

## 2020-06-26 ASSESSMENT — PAIN SCALES - GENERAL: PAINLEVEL_OUTOF10: 4

## 2020-06-26 ASSESSMENT — PAIN DESCRIPTION - DESCRIPTORS: DESCRIPTORS: STABBING

## 2020-06-26 ASSESSMENT — PAIN DESCRIPTION - ORIENTATION: ORIENTATION: RIGHT;MID

## 2020-06-26 ASSESSMENT — PAIN DESCRIPTION - PAIN TYPE: TYPE: ACUTE PAIN

## 2020-06-26 ASSESSMENT — PAIN DESCRIPTION - FREQUENCY: FREQUENCY: INTERMITTENT

## 2020-06-28 LAB
SARS-COV-2: NOT DETECTED
SOURCE: NORMAL

## 2020-07-01 ENCOUNTER — APPOINTMENT (OUTPATIENT)
Dept: GENERAL RADIOLOGY | Age: 55
End: 2020-07-01
Attending: THORACIC SURGERY (CARDIOTHORACIC VASCULAR SURGERY)
Payer: COMMERCIAL

## 2020-07-01 ENCOUNTER — ANESTHESIA (OUTPATIENT)
Dept: ENDOSCOPY | Age: 55
End: 2020-07-01
Payer: COMMERCIAL

## 2020-07-01 ENCOUNTER — HOSPITAL ENCOUNTER (OUTPATIENT)
Age: 55
Setting detail: OUTPATIENT SURGERY
Discharge: HOME | End: 2020-07-01
Attending: THORACIC SURGERY (CARDIOTHORACIC VASCULAR SURGERY) | Admitting: THORACIC SURGERY (CARDIOTHORACIC VASCULAR SURGERY)
Payer: COMMERCIAL

## 2020-07-01 VITALS
TEMPERATURE: 97.2 F | SYSTOLIC BLOOD PRESSURE: 139 MMHG | DIASTOLIC BLOOD PRESSURE: 67 MMHG | RESPIRATION RATE: 16 BRPM | OXYGEN SATURATION: 92 % | BODY MASS INDEX: 38.45 KG/M2 | WEIGHT: 217 LBS | HEART RATE: 77 BPM | HEIGHT: 63 IN

## 2020-07-01 VITALS
OXYGEN SATURATION: 94 % | TEMPERATURE: 98.6 F | DIASTOLIC BLOOD PRESSURE: 84 MMHG | RESPIRATION RATE: 2 BRPM | SYSTOLIC BLOOD PRESSURE: 96 MMHG

## 2020-07-01 PROCEDURE — 7100000001 HC PACU RECOVERY - ADDTL 15 MIN: Performed by: THORACIC SURGERY (CARDIOTHORACIC VASCULAR SURGERY)

## 2020-07-01 PROCEDURE — 87070 CULTURE OTHR SPECIMN AEROBIC: CPT

## 2020-07-01 PROCEDURE — 6360000002 HC RX W HCPCS

## 2020-07-01 PROCEDURE — 7100000010 HC PHASE II RECOVERY - FIRST 15 MIN: Performed by: THORACIC SURGERY (CARDIOTHORACIC VASCULAR SURGERY)

## 2020-07-01 PROCEDURE — 94760 N-INVAS EAR/PLS OXIMETRY 1: CPT

## 2020-07-01 PROCEDURE — 88333 PATH CONSLTJ SURG CYTO XM 1: CPT

## 2020-07-01 PROCEDURE — 87116 MYCOBACTERIA CULTURE: CPT

## 2020-07-01 PROCEDURE — 87205 SMEAR GRAM STAIN: CPT

## 2020-07-01 PROCEDURE — 88334 PATH CONSLTJ SURG CYTO XM EA: CPT

## 2020-07-01 PROCEDURE — 87075 CULTR BACTERIA EXCEPT BLOOD: CPT

## 2020-07-01 PROCEDURE — 94762 N-INVAS EAR/PLS OXIMTRY CONT: CPT

## 2020-07-01 PROCEDURE — 88312 SPECIAL STAINS GROUP 1: CPT

## 2020-07-01 PROCEDURE — 7100000001 HC PACU RECOVERY - ADDTL 15 MIN

## 2020-07-01 PROCEDURE — 6360000002 HC RX W HCPCS: Performed by: NURSE ANESTHETIST, CERTIFIED REGISTERED

## 2020-07-01 PROCEDURE — 7100000000 HC PACU RECOVERY - FIRST 15 MIN

## 2020-07-01 PROCEDURE — 87102 FUNGUS ISOLATION CULTURE: CPT

## 2020-07-01 PROCEDURE — 2500000003 HC RX 250 WO HCPCS: Performed by: NURSE ANESTHETIST, CERTIFIED REGISTERED

## 2020-07-01 PROCEDURE — 88172 CYTP DX EVAL FNA 1ST EA SITE: CPT

## 2020-07-01 PROCEDURE — 31624 DX BRONCHOSCOPE/LAVAGE: CPT

## 2020-07-01 PROCEDURE — 31622 DX BRONCHOSCOPE/WASH: CPT

## 2020-07-01 PROCEDURE — 87252 VIRUS INOCULATION TISSUE: CPT

## 2020-07-01 PROCEDURE — 2709999900 HC NON-CHARGEABLE SUPPLY: Performed by: THORACIC SURGERY (CARDIOTHORACIC VASCULAR SURGERY)

## 2020-07-01 PROCEDURE — 2580000003 HC RX 258: Performed by: ANESTHESIOLOGY

## 2020-07-01 PROCEDURE — 31627 NAVIGATIONAL BRONCHOSCOPY: CPT

## 2020-07-01 PROCEDURE — 88305 TISSUE EXAM BY PATHOLOGIST: CPT

## 2020-07-01 PROCEDURE — 31625 BRONCHOSCOPY W/BIOPSY(S): CPT

## 2020-07-01 PROCEDURE — 7100000000 HC PACU RECOVERY - FIRST 15 MIN: Performed by: THORACIC SURGERY (CARDIOTHORACIC VASCULAR SURGERY)

## 2020-07-01 PROCEDURE — 3700000000 HC ANESTHESIA ATTENDED CARE: Performed by: THORACIC SURGERY (CARDIOTHORACIC VASCULAR SURGERY)

## 2020-07-01 PROCEDURE — 88177 CYTP FNA EVAL EA ADDL: CPT

## 2020-07-01 PROCEDURE — 71045 X-RAY EXAM CHEST 1 VIEW: CPT

## 2020-07-01 PROCEDURE — 2580000003 HC RX 258: Performed by: NURSE ANESTHETIST, CERTIFIED REGISTERED

## 2020-07-01 PROCEDURE — 31645 BRNCHSC W/THER ASPIR 1ST: CPT

## 2020-07-01 PROCEDURE — 88112 CYTOPATH CELL ENHANCE TECH: CPT

## 2020-07-01 PROCEDURE — 3700000001 HC ADD 15 MINUTES (ANESTHESIA): Performed by: THORACIC SURGERY (CARDIOTHORACIC VASCULAR SURGERY)

## 2020-07-01 PROCEDURE — 88173 CYTOPATH EVAL FNA REPORT: CPT

## 2020-07-01 PROCEDURE — C1887 CATHETER, GUIDING: HCPCS

## 2020-07-01 PROCEDURE — 7100000011 HC PHASE II RECOVERY - ADDTL 15 MIN: Performed by: THORACIC SURGERY (CARDIOTHORACIC VASCULAR SURGERY)

## 2020-07-01 PROCEDURE — 76000 FLUOROSCOPY <1 HR PHYS/QHP: CPT

## 2020-07-01 RX ORDER — ONDANSETRON 2 MG/ML
INJECTION INTRAMUSCULAR; INTRAVENOUS PRN
Status: DISCONTINUED | OUTPATIENT
Start: 2020-07-01 | End: 2020-07-01 | Stop reason: SDUPTHER

## 2020-07-01 RX ORDER — SODIUM CHLORIDE, SODIUM LACTATE, POTASSIUM CHLORIDE, CALCIUM CHLORIDE 600; 310; 30; 20 MG/100ML; MG/100ML; MG/100ML; MG/100ML
INJECTION, SOLUTION INTRAVENOUS CONTINUOUS PRN
Status: DISCONTINUED | OUTPATIENT
Start: 2020-07-01 | End: 2020-07-01 | Stop reason: SDUPTHER

## 2020-07-01 RX ORDER — HYDROMORPHONE HCL 110MG/55ML
0.5 PATIENT CONTROLLED ANALGESIA SYRINGE INTRAVENOUS EVERY 5 MIN PRN
Status: DISCONTINUED | OUTPATIENT
Start: 2020-07-01 | End: 2020-07-01 | Stop reason: HOSPADM

## 2020-07-01 RX ORDER — DEXAMETHASONE SODIUM PHOSPHATE 4 MG/ML
INJECTION, SOLUTION INTRA-ARTICULAR; INTRALESIONAL; INTRAMUSCULAR; INTRAVENOUS; SOFT TISSUE PRN
Status: DISCONTINUED | OUTPATIENT
Start: 2020-07-01 | End: 2020-07-01 | Stop reason: SDUPTHER

## 2020-07-01 RX ORDER — SODIUM CHLORIDE, SODIUM LACTATE, POTASSIUM CHLORIDE, CALCIUM CHLORIDE 600; 310; 30; 20 MG/100ML; MG/100ML; MG/100ML; MG/100ML
INJECTION, SOLUTION INTRAVENOUS ONCE
Status: COMPLETED | OUTPATIENT
Start: 2020-07-01 | End: 2020-07-01

## 2020-07-01 RX ORDER — ONDANSETRON 2 MG/ML
4 INJECTION INTRAMUSCULAR; INTRAVENOUS
Status: DISCONTINUED | OUTPATIENT
Start: 2020-07-01 | End: 2020-07-01 | Stop reason: HOSPADM

## 2020-07-01 RX ORDER — HYDROMORPHONE HCL 110MG/55ML
0.25 PATIENT CONTROLLED ANALGESIA SYRINGE INTRAVENOUS EVERY 5 MIN PRN
Status: DISCONTINUED | OUTPATIENT
Start: 2020-07-01 | End: 2020-07-01 | Stop reason: HOSPADM

## 2020-07-01 RX ORDER — FENTANYL CITRATE 50 UG/ML
INJECTION, SOLUTION INTRAMUSCULAR; INTRAVENOUS PRN
Status: DISCONTINUED | OUTPATIENT
Start: 2020-07-01 | End: 2020-07-01 | Stop reason: SDUPTHER

## 2020-07-01 RX ORDER — LABETALOL HYDROCHLORIDE 5 MG/ML
5 INJECTION, SOLUTION INTRAVENOUS EVERY 10 MIN PRN
Status: DISCONTINUED | OUTPATIENT
Start: 2020-07-01 | End: 2020-07-01 | Stop reason: HOSPADM

## 2020-07-01 RX ORDER — LIDOCAINE HYDROCHLORIDE 20 MG/ML
INJECTION, SOLUTION INTRAVENOUS PRN
Status: DISCONTINUED | OUTPATIENT
Start: 2020-07-01 | End: 2020-07-01 | Stop reason: SDUPTHER

## 2020-07-01 RX ORDER — PROMETHAZINE HYDROCHLORIDE 25 MG/ML
6.25 INJECTION, SOLUTION INTRAMUSCULAR; INTRAVENOUS
Status: DISCONTINUED | OUTPATIENT
Start: 2020-07-01 | End: 2020-07-01 | Stop reason: HOSPADM

## 2020-07-01 RX ORDER — FENTANYL CITRATE 50 UG/ML
25 INJECTION, SOLUTION INTRAMUSCULAR; INTRAVENOUS EVERY 5 MIN PRN
Status: DISCONTINUED | OUTPATIENT
Start: 2020-07-01 | End: 2020-07-01 | Stop reason: HOSPADM

## 2020-07-01 RX ORDER — PHENYLEPHRINE HYDROCHLORIDE 10 MG/ML
INJECTION INTRAVENOUS PRN
Status: DISCONTINUED | OUTPATIENT
Start: 2020-07-01 | End: 2020-07-01 | Stop reason: SDUPTHER

## 2020-07-01 RX ORDER — FENTANYL CITRATE 50 UG/ML
50 INJECTION, SOLUTION INTRAMUSCULAR; INTRAVENOUS EVERY 5 MIN PRN
Status: DISCONTINUED | OUTPATIENT
Start: 2020-07-01 | End: 2020-07-01 | Stop reason: HOSPADM

## 2020-07-01 RX ORDER — ROCURONIUM BROMIDE 10 MG/ML
INJECTION, SOLUTION INTRAVENOUS PRN
Status: DISCONTINUED | OUTPATIENT
Start: 2020-07-01 | End: 2020-07-01 | Stop reason: SDUPTHER

## 2020-07-01 RX ORDER — PROPOFOL 10 MG/ML
INJECTION, EMULSION INTRAVENOUS PRN
Status: DISCONTINUED | OUTPATIENT
Start: 2020-07-01 | End: 2020-07-01 | Stop reason: SDUPTHER

## 2020-07-01 RX ORDER — HYDRALAZINE HYDROCHLORIDE 20 MG/ML
5 INJECTION INTRAMUSCULAR; INTRAVENOUS EVERY 10 MIN PRN
Status: DISCONTINUED | OUTPATIENT
Start: 2020-07-01 | End: 2020-07-01 | Stop reason: HOSPADM

## 2020-07-01 RX ADMIN — ONDANSETRON 4 MG: 2 INJECTION INTRAMUSCULAR; INTRAVENOUS at 15:17

## 2020-07-01 RX ADMIN — ROCURONIUM BROMIDE 60 MG: 10 INJECTION INTRAVENOUS at 14:05

## 2020-07-01 RX ADMIN — PROPOFOL 180 MG: 10 INJECTION, EMULSION INTRAVENOUS at 14:05

## 2020-07-01 RX ADMIN — FENTANYL CITRATE 50 MCG: 50 INJECTION INTRAMUSCULAR; INTRAVENOUS at 15:03

## 2020-07-01 RX ADMIN — DEXAMETHASONE SODIUM PHOSPHATE 8 MG: 4 INJECTION, SOLUTION INTRAMUSCULAR; INTRAVENOUS at 14:05

## 2020-07-01 RX ADMIN — FENTANYL CITRATE 100 MCG: 50 INJECTION INTRAMUSCULAR; INTRAVENOUS at 14:05

## 2020-07-01 RX ADMIN — ROCURONIUM BROMIDE 10 MG: 10 INJECTION INTRAVENOUS at 15:09

## 2020-07-01 RX ADMIN — SUGAMMADEX 197 MG: 100 INJECTION, SOLUTION INTRAVENOUS at 15:11

## 2020-07-01 RX ADMIN — PHENYLEPHRINE HYDROCHLORIDE 100 MCG: 10 INJECTION INTRAVENOUS at 14:58

## 2020-07-01 RX ADMIN — ONDANSETRON 4 MG: 2 INJECTION INTRAMUSCULAR; INTRAVENOUS at 15:10

## 2020-07-01 RX ADMIN — SODIUM CHLORIDE, POTASSIUM CHLORIDE, SODIUM LACTATE AND CALCIUM CHLORIDE: 600; 310; 30; 20 INJECTION, SOLUTION INTRAVENOUS at 12:08

## 2020-07-01 RX ADMIN — FENTANYL CITRATE 50 MCG: 50 INJECTION INTRAMUSCULAR; INTRAVENOUS at 14:50

## 2020-07-01 RX ADMIN — LIDOCAINE HYDROCHLORIDE 100 MG: 20 INJECTION, SOLUTION INTRAVENOUS at 14:05

## 2020-07-01 RX ADMIN — PHENYLEPHRINE HYDROCHLORIDE 100 MCG: 10 INJECTION INTRAVENOUS at 14:55

## 2020-07-01 RX ADMIN — PHENYLEPHRINE HYDROCHLORIDE 100 MCG: 10 INJECTION INTRAVENOUS at 14:43

## 2020-07-01 RX ADMIN — SODIUM CHLORIDE, POTASSIUM CHLORIDE, SODIUM LACTATE AND CALCIUM CHLORIDE: 600; 310; 30; 20 INJECTION, SOLUTION INTRAVENOUS at 14:01

## 2020-07-01 ASSESSMENT — PULMONARY FUNCTION TESTS
PIF_VALUE: 27
PIF_VALUE: 25
PIF_VALUE: 24
PIF_VALUE: 24
PIF_VALUE: 26
PIF_VALUE: 26
PIF_VALUE: 31
PIF_VALUE: 14
PIF_VALUE: 27
PIF_VALUE: 29
PIF_VALUE: 25
PIF_VALUE: 24
PIF_VALUE: 0
PIF_VALUE: 11
PIF_VALUE: 24
PIF_VALUE: 23
PIF_VALUE: 31
PIF_VALUE: 24
PIF_VALUE: 17
PIF_VALUE: 29
PIF_VALUE: 30
PIF_VALUE: 27
PIF_VALUE: 29
PIF_VALUE: 0
PIF_VALUE: 27
PIF_VALUE: 28
PIF_VALUE: 25
PIF_VALUE: 13
PIF_VALUE: 28
PIF_VALUE: 33
PIF_VALUE: 3
PIF_VALUE: 29
PIF_VALUE: 2
PIF_VALUE: 32
PIF_VALUE: 29
PIF_VALUE: 28
PIF_VALUE: 27
PIF_VALUE: 30
PIF_VALUE: 26
PIF_VALUE: 1
PIF_VALUE: 27
PIF_VALUE: 27
PIF_VALUE: 24
PIF_VALUE: 26
PIF_VALUE: 25
PIF_VALUE: 30
PIF_VALUE: 0
PIF_VALUE: 25
PIF_VALUE: 24
PIF_VALUE: 24
PIF_VALUE: 23
PIF_VALUE: 25
PIF_VALUE: 26
PIF_VALUE: 26
PIF_VALUE: 23
PIF_VALUE: 25
PIF_VALUE: 24
PIF_VALUE: 26
PIF_VALUE: 6
PIF_VALUE: 23
PIF_VALUE: 24
PIF_VALUE: 22
PIF_VALUE: 35
PIF_VALUE: 1
PIF_VALUE: 2
PIF_VALUE: 23
PIF_VALUE: 24
PIF_VALUE: 24
PIF_VALUE: 0
PIF_VALUE: 4
PIF_VALUE: 27
PIF_VALUE: 1
PIF_VALUE: 24
PIF_VALUE: 0
PIF_VALUE: 24
PIF_VALUE: 0
PIF_VALUE: 1
PIF_VALUE: 0
PIF_VALUE: 30
PIF_VALUE: 26
PIF_VALUE: 30
PIF_VALUE: 24
PIF_VALUE: 24
PIF_VALUE: 27
PIF_VALUE: 0
PIF_VALUE: 24
PIF_VALUE: 26
PIF_VALUE: 1
PIF_VALUE: 29
PIF_VALUE: 0
PIF_VALUE: 27
PIF_VALUE: 27
PIF_VALUE: 26
PIF_VALUE: 0
PIF_VALUE: 27
PIF_VALUE: 35
PIF_VALUE: 1
PIF_VALUE: 25

## 2020-07-01 ASSESSMENT — LIFESTYLE VARIABLES: SMOKING_STATUS: 1

## 2020-07-01 NOTE — PROGRESS NOTES
Bronch:  Assisted Dr. Jefferson Gutierrez        with navigational bronchoscopy. Scope used #    [id number]  . Prepared patient for bronchoscopy. See Physicians note for details.

## 2020-07-01 NOTE — PROGRESS NOTES
1625:  Pt received from PACU s/p bronchoscopy. Report received from DOUG Williamson. Pt presented alert and oriented with no c/o sob or difficulty breathing. Tolerating PO. Call light with in reach.

## 2020-07-01 NOTE — ANESTHESIA POSTPROCEDURE EVALUATION
Department of Anesthesiology  Postprocedure Note    Patient: Daja Gonzales  MRN: [id number]  YOB: 1965  Date of evaluation: 7/1/2020  Time:  3:39 PM     Procedure Summary     Date:  07/01/20 Room / Location:  [address] [place] [address]    Anesthesia Start:  1401 Anesthesia Stop:  7103    Procedure:  GIULIA BRONCHOSCOPY (N/A ) Diagnosis:  (LUNG NODULE)    Surgeon:  Macie Marrero MD Responsible Provider:  SPENCER Kerns CRNA    Anesthesia Type:  general ASA Status:  3          Anesthesia Type: general    April Phase I:      April Phase II:      Last vitals: Reviewed and per EMR flowsheets.        Anesthesia Post Evaluation    Patient location during evaluation: PACU  Patient participation: complete - patient participated  Level of consciousness: awake and alert  Pain score: 0  Airway patency: patent  Nausea & Vomiting: no vomiting and no nausea  Complications: no  Cardiovascular status: hemodynamically stable  Respiratory status: face mask, nonlabored ventilation and spontaneous ventilation  Hydration status: stable

## 2020-07-01 NOTE — ANESTHESIA PRE PROCEDURE
Provider, MD   aspirin 81 MG tablet Take 81 mg by mouth daily. Historical Provider, MD   clopidogrel (PLAVIX) 75 MG tablet Take 75 mg by mouth daily. Historical Provider, MD   metoprolol (LOPRESSOR) 25 MG tablet Take 25 mg by mouth 2 times daily. Historical Provider, MD       Current medications:    No current outpatient medications on file. No current facility-administered medications for this visit. Allergies: Allergies   Allergen Reactions    Darvocet [Propoxyphene N-Acetaminophen] Hives    Demerol Hives    Toradol [Ketorolac Tromethamine] Hives    Ultram [Tramadol] Hives       Problem List:    Patient Active Problem List   Diagnosis Code    DVT (deep vein thrombosis) in pregnancy O22.30    DVT (deep venous thrombosis) ([facility]) I82.409    Claudication in peripheral vascular disease ([facility] I73.9    Pulmonary nodule R91.1       Past Medical History:        Diagnosis Date    DVT [facility]deep venous thrombosis) ([facility])     Hx of blood clots 2015    DVT right leg    Hypertension     Kidney stone     PAD (peripheral artery disease) ([facility] 10/26/2015       Past Surgical History:        Procedure Laterality Date    LEG SURGERY      stent    OVARIAN CYST SURGERY      TUBAL LIGATION      VASCULAR SURGERY Bilateral        Social History:    Social History     Tobacco Use    Smoking status: Current Every Day Smoker     Packs/day: 1.00     Years: 30.00     Pack years: 30.00     Types: Cigarettes    Smokeless tobacco: Never Used    Tobacco comment: stop smoking 6/26/2020   Substance Use Topics    Alcohol use: No                                Ready to quit: Not Answered  Counseling given: Not Answered  Comment: stop smoking 6/26/2020      Vital Signs (Current): There were no vitals filed for this visit.                                            BP Readings from Last 3 Encounters:   07/01/20 (!) 117/56   06/26/20 133/71   06/09/20 113/63       NPO Status: BMI:   Wt Readings from Last 3 Encounters:   07/01/20 217 lb (98.4 kg)   06/26/20 217 lb 4 oz (98.5 kg)   06/09/20 200 lb (90.7 kg)     There is no height or weight on file to calculate BMI. CBC  Lab Results   Component Value Date/Time    WBC 6.5 06/26/2020 11:40 AM    HGB 13.8 06/26/2020 11:40 AM    HCT 42.7 06/26/2020 11:40 AM     06/26/2020 11:40 AM     RENAL  Lab Results   Component Value Date/Time     06/26/2020 11:40 AM    K 4.5 06/26/2020 11:40 AM     06/26/2020 11:40 AM    CO2 26 06/26/2020 11:40 AM    BUN 12 06/26/2020 11:40 AM    CREATININE 0.7 06/26/2020 11:40 AM    GLUCOSE 122 (H) 06/26/2020 11:40 AM     COAGS  Lab Results   Component Value Date/Time    PROTIME 11.4 (L) 06/26/2020 11:40 AM    PROTIME 9.8 (L) 04/17/2012 01:25 PM    INR 0.94 06/26/2020 11:40 AM    APTT 31.9 06/26/2020 11:40 AM       COVID-19 Screening (If Applicable):   Lab Results   Component Value Date    COVID19 NOT DETECTED 06/26/2020         Anesthesia Evaluation  Patient summary reviewed and Nursing notes reviewed  Airway: Mallampati: II  TM distance: >3 FB   Neck ROM: full  Mouth opening: > = 3 FB Dental:    (+) edentulous      Pulmonary:normal exam    (+) current smoker          Patient did not smoke on day of surgery. ROS comment: multiple bilateral lung lesions. CT lungs 6/2020  1. Negative for acute pulmonary embolism  2. Numerous masslike irregular nodular opacities throughout both lungs. Although an infectious or inflammatory process could give this appearance, a  neoplastic process needs to be considered and follow-up is suggested to ensure resolution.       smokes 1 pack a day x 25 years. smoker  Counseled on smoking cessation. PAT Airway. Limited exam. COVID 19 precautions.  Patient wearing mask  Head/Neck movement:   History of difficult intubation:  None  Teeth: all extracted   Able to lie flat     LUNGS:  No increased work of breathing, good air exchange, clear to auscultation bilaterally, no crackles or wheezing      COVID 19 screening  Denies recent fever or known COVID 19 exposure. Instructed to quarantine until surgery and report any new respiratory or fever symptoms to surgeon. Aware COVID testing must be completed before DOS. COVID swab:  2020   Cardiovascular:  Exercise tolerance: good (>4 METS),   (+) hypertension (on beta blocker ):,       ECG reviewed               Beta Blocker:  Not on Beta Blocker      ROS comment:  CARDIOVASCULAR:  Normal apical impulse, regular rate and rhythm, normal S1 and S2, no S3 or S4, and no murmur noted    CP or SOB: NO   Exercise: no     EK2020  SB, HR 58     Neuro/Psych:   (+) psychiatric history (PTSD, marijuana use. ):depression/anxiety             GI/Hepatic/Renal:   (+) GERD: well controlled, renal disease (hx mulitple kidney stones,  last  ): kidney stones,           Endo/Other:              Pt had PAT visit. ROS comment:  Closed fracture of sacrum, 2018 Abdominal:           Vascular:   + PVD, aortic or cerebral (Hx PVD, claudication. s/p aortic stenting w/ kissing iliac stent placement, 2018. S/p anshul common iliac stents, bill external iliac artery stents, 2016. S/p stenting of the left common iliac artery , 2014 On pletal and plavix, last dose 2020), DVT (2015  provoked after long car trip.  2020 No evidence of DVT in the right lower extremity.), . Anesthesia Plan      general     ASA 3       Induction: intravenous. MIPS: Prophylactic antiemetics administered. Anesthetic plan and risks discussed with patient. Use of blood products discussed with patient whom consented to blood products. Plan discussed with CRNA. SPENCER Maddox - CRNA Chart reviewed, pt. Interviewed and examined. Anesthesia Evaluation will follow by a certified practitioner prior to surgery.   2020

## 2020-07-01 NOTE — PROGRESS NOTES
1533: Patient arrived to PACU from Endo. Monitors applied, alarms on. Patient drowsy but arousable. VS stable, wdl. Report obtained from [facility].  1600: Patient turned and repositioned in bed. Tolerated well.   3474: Radiology at bedside for post op chest xray. 1618: Phase 1 care complete. 1622: Patient transferred to same day. Tereza Henry RN at bedside.

## 2020-07-01 NOTE — PROGRESS NOTES
1710:  Pt discharged to home via wheelchair alert and oriented with no c/o sob, difficulty breathing. CXR verified prior to discharge.

## 2020-07-02 NOTE — H&P
Patient was seen in pre-op. I have reviewed the history and physical.  I have examined the patient today. There are no changes noted from the H & P available in chart. The patient was counseled at length about the risks of messi Covid-19 during their perioperative period and any recovery window from their procedure. The patient was made aware that messi Covid-19  may worsen their prognosis for recovering from their procedure  and lend to a higher morbidity and/or mortality risk. All material risks, benefits, and reasonable alternatives including postponing the procedure were discussed. The patient does wish to proceed with the procedure at this time.

## 2020-07-02 NOTE — OP NOTE
throughout bilateral lung spaces. Once we had cleared out  the lung spaces, the LG probe was placed and automatic registration was  performed. Once registration was performed, we then tracked a right  upper lobe lung lesion under navigational bronchoscopic guidance. Once  we had the guide into the lesion, we then removed our LG probe with  catheter still in place. We placed a radial EBUS probe to assure that  we were in the appropriate position which we were. Radiology was also  used to confirm this. We then began by performing brushings, Arcpoint  needle, and forceps biopsies. Once we completed all of these biopsies  and pathology had looked at this, at this point BAL was performed and  the catheter was removed. Hemostasis was achieved. The patient  tolerated the procedure well. She was transported to the PACU in stable  condition.         Guerrero Banks MD    D: 07/01/2020 19:46:28       T: 07/01/2020 22:22:46     [email or web address]  Job#: [id number]     Doc#: [id number]    CC:

## 2020-07-06 LAB
CULTURE: NORMAL
Lab: NORMAL
SPECIMEN: NORMAL

## 2020-07-13 LAB
FINAL RESULT: NORMAL
PRELIMINARY: NORMAL

## 2020-07-29 ENCOUNTER — APPOINTMENT (OUTPATIENT)
Dept: GENERAL RADIOLOGY | Age: 55
End: 2020-07-29
Payer: COMMERCIAL

## 2020-07-29 ENCOUNTER — HOSPITAL ENCOUNTER (EMERGENCY)
Age: 55
Discharge: HOME | End: 2020-07-29
Attending: EMERGENCY MEDICINE
Payer: COMMERCIAL

## 2020-07-29 VITALS
WEIGHT: 217 LBS | OXYGEN SATURATION: 95 % | SYSTOLIC BLOOD PRESSURE: 130 MMHG | DIASTOLIC BLOOD PRESSURE: 87 MMHG | RESPIRATION RATE: 17 BRPM | HEART RATE: 99 BPM | HEIGHT: 63 IN | BODY MASS INDEX: 38.45 KG/M2 | TEMPERATURE: 99.6 F

## 2020-07-29 PROCEDURE — 73030 X-RAY EXAM OF SHOULDER: CPT

## 2020-07-29 PROCEDURE — 99283 EMERGENCY DEPT VISIT LOW MDM: CPT

## 2020-07-29 RX ORDER — NAPROXEN 500 MG/1
500 TABLET ORAL 2 TIMES DAILY PRN
Qty: 30 TABLET | Refills: 0 | Status: SHIPPED | OUTPATIENT
Start: 2020-07-29 | End: 2020-09-01

## 2020-07-29 ASSESSMENT — PAIN DESCRIPTION - LOCATION: LOCATION: SHOULDER

## 2020-07-29 ASSESSMENT — PAIN DESCRIPTION - ORIENTATION: ORIENTATION: RIGHT

## 2020-07-29 ASSESSMENT — PAIN DESCRIPTION - ONSET: ONSET: SUDDEN

## 2020-07-29 ASSESSMENT — PAIN SCALES - GENERAL: PAINLEVEL_OUTOF10: 9

## 2020-07-29 ASSESSMENT — PAIN DESCRIPTION - FREQUENCY: FREQUENCY: CONTINUOUS

## 2020-07-29 ASSESSMENT — PAIN DESCRIPTION - PAIN TYPE: TYPE: ACUTE PAIN

## 2020-07-29 ASSESSMENT — PAIN DESCRIPTION - DESCRIPTORS: DESCRIPTORS: STABBING;SHOOTING

## 2020-07-29 NOTE — ED PROVIDER NOTES
eMERGENCY dEPARTMENT eNCOUnter      PCP: Martina Larios, [facility]    Chief Complaint   Patient presents with    Fall    Arm Injury     rt shoulder        HPI    Tristan Gonzales is a 50 y.o. female who presents with right shoulder pain. States that she slipped in her kitchen hitting her shoulder on the refrigerator. States that that occurred this morning. States that she laid back down to go back to sleep afterward and then awoke and had more pain in the shoulder, thus came in at that time. Denies any other injury at the time. States hurts primarily in the posterior and anterior aspects of the shoulder. No upper arm pain. No neck pain. Pain is continuous, worse with trying to move it. No distal numbness, tingling, weakness, or functional deficit. No other injuries. REVIEW OF SYSTEMS    General: Denies fever or chills  Cardiac: Denies chest pain  Pulmonary: Denies shortness of breath, cough  GI: Denies abdominal pain, vomiting, or diarrhea  Neuro: No numbness, weakness  MS: + shoulder pain, no elbow pain      Denies any other muscles skeletal injuries, including chest wall and back.     All other review of systems are negative  See HPI and nursing notes for additional information     PAST MEDICAL & SURGICAL HISTORY    Past Medical History:   Diagnosis Date    [facility]) [facility]     Hx of blood clots 2015    DVT right leg    Hypertension     Kidney stone     PAD (peripheral artery disease) ([facility] 10/26/2015     Past Surgical History:   Procedure Laterality Date    BRONCHOSCOPY N/A 7/1/2020    GIULIA BRONCHOSCOPY performed by Janis Schwartz MD at [address] [place] [address]      stent    OVARIAN CYST SURGERY      TUBAL LIGATION      VASCULAR SURGERY Bilateral        CURRENT MEDICATIONS    Current Outpatient Rx   Medication Sig Dispense Refill    ibuprofen (ADVIL;MOTRIN) 800 MG tablet Take 1 tablet by mouth every 8 hours as needed for Pain 24 tablet 0    raNITIdine HCl Medical: None     Non-medical: None   Tobacco Use    Smoking status: Current Every Day Smoker     Packs/day: 0.50     Years: 30.00     Pack years: 15.00     Types: Cigarettes    Smokeless tobacco: Never Used    Tobacco comment: stop smoking 6/26/2020   Substance and Sexual Activity    Alcohol use: No    Drug use: No     Types: Marijuana     Comment: States does not do marijuana anymore    Sexual activity: Yes     Partners: Male   Lifestyle    Physical activity     Days per week: None     Minutes per session: None    Stress: None   Relationships    Social connections     Talks on phone: None     Gets together: None     Attends Mosque service: None     Active member of club or organization: None     Attends meetings of clubs or organizations: None     Relationship status: None    Intimate partner violence     Fear of current or ex partner: None     Emotionally abused: None     Physically abused: None     Forced sexual activity: None   Other Topics Concern    None   Social History Narrative    None     Family History   Problem Relation Age of Onset    Heart Disease Mother     Heart Disease Father     Cancer Father            PHYSICAL EXAM    VITAL SIGNS: /87   Pulse 99   Temp 99.6 °F (37.6 °C) (Temporal)   Resp 17   Ht 5' 3\" (1.6 m)   Wt 217 lb (98.4 kg)   LMP 01/28/2013   SpO2 95%   BMI 38.44 kg/m²   Constitutional:  Well developed, Appears comfortable    Musculoskeletal:    Neck:  No gross swelling or discoloration on inspection. No tenderness to palpation or palpable defect. Full range of motion without pain. Right Shoulder Exam:   -Inspection:   No obvious defects, deformities, or discoloration. Skin intact   - Palpation: No swelling     No redness, or warmth     Tenderness over the anterior and posterior aspect of the right shoulder.         -ROM:   Limited ROM due to pain    No swelling, discoloration, tenderness to palpation, or range of motion deficit of the ipsilateral elbow.  Cardiac: Regular rate and rhythm  Lungs: Nonlabored respirations, CTAB  Vascular: Distal pulses intact   Integument:  Well hydrated, no rash   Neurologic:  Alert and oriented. Distal sensation intact. No functional deficits of elbows, wrists, hands, or fingers. Psychiatric: Cooperative, pleasant affect        RADIOLOGY/PROCEDURES      Right Shoulder X-RAYS:   Xr Shoulder Right (min 2 Views)    Result Date: 7/29/2020  EXAMINATION: THREE XRAY VIEWS OF THE RIGHT SHOULDER 7/29/2020 2:56 pm COMPARISON: 10/06/2019 HISTORY: ORDERING SYSTEM PROVIDED HISTORY: trauma TECHNOLOGIST PROVIDED HISTORY: Right Shoulder - XR Portable Reason for exam:->trauma FINDINGS: No evidence of acute fracture or dislocation in the right shoulder. Partially visualized lungs demonstrate diffuse interstitial opacities in the right lung which may be secondary to edema or pneumonia. No acute radiographic abnormality in the right shoulder. Diffuse interstitial opacities in the partially visualized right lung suggestive of edema or pneumonia. Recommend chest radiograph. Fl Less Than 1 Hour    Result Date: 7/1/2020  EXAMINATION: SPOT FLUOROSCOPIC IMAGES 7/1/2020 3:24 pm TECHNIQUE: Fluoroscopy was provided by the radiology department for procedure. Radiologist was not present during examination. FLUOROSCOPY DOSE AND TYPE OR TIME AND EXPOSURES: 1 fluoroscopic spot image representative of 7 minutes 3 seconds of fluoroscopy time COMPARISON: None HISTORY: ORDERING SYSTEM PROVIDED HISTORY: jeremias bronch TECHNOLOGIST PROVIDED HISTORY: Reason for exam:->jeremias bronch Reason for Exam: bronchoscope Intraprocedural imaging. FINDINGS: 1 spot image of the chest was obtained. Images demonstrate bronchoscope overlying the chest.  Imaging obtained for the purpose of guidance of a bronchoscopy. Please see performing physician note for full detail. Intraprocedural fluoroscopic spot images as above. See separate procedure report for more information.

## 2020-07-29 NOTE — ED TRIAGE NOTES
Pt st she slipped and fell at 0600 hit her shoulder no loc, went back to bed and when she got up later painful to move her shoulder

## 2020-07-29 NOTE — ED NOTES
Mirnaing applied  Discharge instructions given with prescription verbalized understanding pt is ambulatory out       Amanda Greenfield, DOUG  07/29/20 1469

## 2020-08-03 LAB
CULTURE: NORMAL
FUNGUS STAIN: NORMAL
Lab: NORMAL
SPECIMEN: NORMAL

## 2020-08-18 LAB
AFB SMEAR: NORMAL
CULTURE: NORMAL
Lab: NORMAL
SPECIMEN: NORMAL

## 2020-09-01 ENCOUNTER — OFFICE VISIT (OUTPATIENT)
Dept: PULMONOLOGY | Age: 55
End: 2020-09-01
Payer: COMMERCIAL

## 2020-09-01 VITALS
WEIGHT: 224.6 LBS | SYSTOLIC BLOOD PRESSURE: 114 MMHG | BODY MASS INDEX: 39.8 KG/M2 | DIASTOLIC BLOOD PRESSURE: 70 MMHG | HEIGHT: 63 IN | OXYGEN SATURATION: 95 % | HEART RATE: 76 BPM

## 2020-09-01 PROCEDURE — G8417 CALC BMI ABV UP PARAM F/U: HCPCS | Performed by: NURSE PRACTITIONER

## 2020-09-01 PROCEDURE — 99243 OFF/OP CNSLTJ NEW/EST LOW 30: CPT | Performed by: NURSE PRACTITIONER

## 2020-09-01 PROCEDURE — G8427 DOCREV CUR MEDS BY ELIG CLIN: HCPCS | Performed by: NURSE PRACTITIONER

## 2020-09-01 RX ORDER — TIZANIDINE 4 MG/1
1 TABLET ORAL 3 TIMES DAILY PRN
COMMUNITY
Start: 2020-08-28

## 2020-09-01 RX ORDER — PREDNISONE 20 MG/1
40 TABLET ORAL DAILY
Qty: 10 TABLET | Refills: 0 | Status: SHIPPED | OUTPATIENT
Start: 2020-09-01 | End: 2020-09-06

## 2020-09-01 RX ORDER — OXYBUTYNIN CHLORIDE 5 MG/1
5 TABLET ORAL 2 TIMES DAILY
COMMUNITY

## 2020-09-01 ASSESSMENT — SLEEP AND FATIGUE QUESTIONNAIRES
NECK CIRCUMFERENCE (INCHES): 16
HOW LIKELY ARE YOU TO NOD OFF OR FALL ASLEEP WHEN YOU ARE A PASSENGER IN A CAR FOR AN HOUR WITHOUT A BREAK: 1
ESS TOTAL SCORE: 1
HOW LIKELY ARE YOU TO NOD OFF OR FALL ASLEEP WHILE SITTING AND READING: 0
HOW LIKELY ARE YOU TO NOD OFF OR FALL ASLEEP WHILE LYING DOWN TO REST IN THE AFTERNOON WHEN CIRCUMSTANCES PERMIT: 0
HOW LIKELY ARE YOU TO NOD OFF OR FALL ASLEEP IN A CAR, WHILE STOPPED FOR A FEW MINUTES IN TRAFFIC: 0
HOW LIKELY ARE YOU TO NOD OFF OR FALL ASLEEP WHILE SITTING QUIETLY AFTER LUNCH WITHOUT ALCOHOL: 0
HOW LIKELY ARE YOU TO NOD OFF OR FALL ASLEEP WHILE SITTING AND TALKING TO SOMEONE: 0
HOW LIKELY ARE YOU TO NOD OFF OR FALL ASLEEP WHILE SITTING INACTIVE IN A PUBLIC PLACE: 0
HOW LIKELY ARE YOU TO NOD OFF OR FALL ASLEEP WHILE WATCHING TV: 0

## 2020-09-01 NOTE — PROGRESS NOTES
Subjective:   CHIEF COMPLAINT / HPI:       Lili Bernstein is a 50 y.o. female with history of pulmonary nodule. Olga Dorantes states she initially presented to the emergency department on 6/9/2020 for complaint of right thoracic pain x3 to 4 days. She described the pain is constant achy and tight. CTA was obtained in the ER which demonstrated numerous irregular masslike opacity seen in both lungs. One seen medially in the left lower lobe measured 18 mm. Multiple other nodules are seen in the left lower lobe. One seen medially in the left upper lobe measures 16 mm. The largest in the right upper lobe measures 3.5 cm. She then followed up with Dr. Andrena Councilman and underwent navigational bronchoscopy with biopsy on 7/1/2020. Biopsy demonstrated chronic pneumonitis. Olga Dorantes was then referred to the pulmonary clinic for evaluation. Zina Alejo is a smoker, she started age 26, she quit 6/9/2020 when she discovered in the ED abnormalities on her CTA. She states she is smoked a pack a day most of her life. She has approximate 32 pack year history. James Bender states she does have a history of DVT when she was pregnant, she denies ever having a pulmonary emboli. She states she does have claudication and PVD. She states her DVT was in 2015 for which she was on anticoagulation therapy for a short time but is no longer on therapy. Olga Dorantes states they are practicing social distancing, wearing a mask when out in public, washing hands frequently or using hand . Denies any fever, chills, malaise, change in sensation of taste or smell, headache or lightheadedness. Denies any known contacts with persons with respiratory infection, positive for coronavirus or under investigation for possible coronavirus exposure.     Influenza immunization: 10/28/2018, she states she is not sure why she did not receive last year  Pneumococcal immunization: Prevnar 7/10/2010 2012, Pneumovax 23 on 10/28/2018  Smoker quit smoking June 2020, 32-pack-year history  PCP BHARAT Jones    Past Medical History:  Past Medical History:   Diagnosis Date    [facility]     Hx of blood clots 2015    DVT right leg    Hypertension     Kidney stone     PAD (peripheral artery disease) ([facility]) 10/26/2015       Current Medications:      Current Outpatient Medications:     oxybutynin (DITROPAN) 5 MG tablet, Take 5 mg by mouth 2 times daily, Disp: , Rfl:     tiZANidine (ZANAFLEX) 4 MG tablet, Take 1 tablet by mouth 3 times daily as needed, Disp: , Rfl:     predniSONE (DELTASONE) 20 MG tablet, Take 2 tablets by mouth daily for 5 days, Disp: 10 tablet, Rfl: 0    cilostazol (PLETAL) 50 MG tablet, Take 50 mg by mouth daily, Disp: , Rfl:     sertraline (ZOLOFT) 100 MG tablet, Take 100 mg by mouth daily, Disp: , Rfl:     Compression Stockings MISC, by Does not apply route Do not wear for more than 8 hours per day. Please fit and supply thigh high compression stockings, Disp: 1 each, Rfl: 0    gabapentin (NEURONTIN) 300 MG capsule, Take two tablets po tid (Patient taking differently: Take 600 mg by mouth 3 times daily. Take two tablets po tid), Disp: 90 capsule, Rfl: 0    OLANZapine zydis (ZYPREXA) 5 MG disintegrating tablet, Take 10 mg by mouth nightly , Disp: , Rfl:     busPIRone (BUSPAR) 10 MG tablet, Take 15 mg by mouth 2 times daily , Disp: , Rfl:     traZODone (DESYREL) 50 MG tablet, Take 100 mg by mouth nightly as needed for Sleep., Disp: , Rfl:     aspirin 81 MG tablet, Take 81 mg by mouth daily. , Disp: , Rfl:     clopidogrel (PLAVIX) 75 MG tablet, Take 75 mg by mouth daily. , Disp: , Rfl:     metoprolol (LOPRESSOR) 25 MG tablet, Take 25 mg by mouth 2 times daily.   , Disp: , Rfl:     Allergies   Allergen Reactions    Darvocet [Propoxyphene N-Acetaminophen] Hives    Demerol Hives    Toradol [Ketorolac Tromethamine] Hives    Ultram [Tramadol] Hives       Social History:    Social History     Socioeconomic History    Marital activity  CARDIOVASCULAR:  Negative for chest pain, palpitations, exertional chest pressure/discomfort, edema, syncope  GASTROINTESTINAL: negative for nausea, vomiting, diarrhea, constipation, blood in stool and abdominal pain  GENITOURINARY:  negative for frequency, dysuria and hematuria  HEMATOLOGIC/LYMPHATIC:  negative for easy bruising, bleeding and lymphadenopathy  ALLERGIC/IMMUNOLOGIC:  negative for recurrent infections, angioedema, anaphylaxis and drug reaction  MUSCULOSKELETAL:  negative for  pain, joint swelling, decreased range of motion and muscle weakness  NEURO: negative for headache, AMS, decrease sensations  SKIN: Negative for rashes or lesions      Objective:   VITALS:   Vitals:    09/01/20 1441   BP: 114/70   Pulse: 76   SpO2: 95%   Weight: 224 lb 9.6 oz (101.9 kg)   Height: 5' 3\" (1.6 m)     Neck circumference: 16  Inches  Robinson - Total score: 1  MALLAMPATI: 2  Body mass index is 39.79 kg/m². PHYSICAL EXAM:    CONSTITUTIONAL:  awake, alert, cooperative, no apparent distress, and appears stated age  HEENT:  Supple and nontender,  trachea midline, no adenopathy, thyroid normal, no JVD, no wheezing or stridor over neck  CHEST: Chest expansion equal and symmetrical, no intercostal retraction, no increased work of breathing, no tenderness to palpation over posterior right chest  LUNGS: Bilateral breath sounds clear and equal to auscultation, good air movement, no use of accessory muscles to support respiratory effort. No forced expiratory wheeze, no rales, no rhonchi. CARDIOVASCULAR: Normal S1 and S2 , no murmurs or gallops ,no pericardial rubs  ABDOMEN:  normal bowel sounds, non-distended and no masses palpated, and no tenderness to palpation. LYMPHADENOPATHY:  no axillary or supraclavicular adenopathy.  No cervical adenopathy  EXTREMITIES: No edema, no inflammation, no tenderness, no clubbing of digits  NEURO: Oriented X 3, No focal deficits  SKIN: warm and dry    LAB:CBC with Differential: Lab Results   Component Value Date    WBC 6.5 06/26/2020    RBC 4.59 06/26/2020    HGB 13.8 06/26/2020    HCT 42.7 06/26/2020     06/26/2020    MCV 93.0 06/26/2020    MCH 30.1 06/26/2020    MCHC 32.3 06/26/2020    RDW 13.2 06/26/2020    SEGSPCT 71.5 06/26/2020    LYMPHOPCT 22.2 06/26/2020    MONOPCT 3.7 06/26/2020    EOSPCT 2.2 04/17/2012    BASOPCT 0.6 06/26/2020    MONOSABS 0.2 06/26/2020    LYMPHSABS 1.4 06/26/2020    EOSABS 0.1 06/26/2020    BASOSABS 0.0 06/26/2020    DIFFTYPE AUTOMATED DIFFERENTIAL 06/26/2020     BMP:    Lab Results   Component Value Date     06/26/2020    K 4.5 06/26/2020     06/26/2020    CO2 26 06/26/2020    BUN 12 06/26/2020    CREATININE 0.7 06/26/2020    CALCIUM 9.6 06/26/2020    GFRAA >60 06/26/2020    LABGLOM >60 06/26/2020    GLUCOSE 122 06/26/2020     Hepatic Function Panel:    Lab Results   Component Value Date    ALKPHOS 160 06/09/2020    ALT 12 06/09/2020    AST 12 06/09/2020    PROT 6.8 06/09/2020    PROT 6.7 07/06/2012    BILITOT 0.3 06/09/2020     ABG:  No results found for: [place], BEART, T6SATRCC, PHART, THGBART, IXR5YCP, PO2ART, GTU1NSD    RADIOLOGY:  CTA lung 6/9/2020  Lungs/pleura: Numerous irregular masslike opacities seen in both lungs.  1 seen medially in the left lower lobe measures 18 mm.  Multiple other nodules are seen in the left lower lobe.  1 seen medially in the left upper lobe measures 16 mm.  The largest is in the right upper lobe and measures 3.5 cm.    1. Negative for acute pulmonary embolism    2. Numerous masslike irregular nodular opacities throughout both lungs. Although an infectious or inflammatory process could give this appearance, a    neoplastic process needs to be considered and follow-up is suggested to    ensure resolution. PFT: To be obtained    Assessment      1.  Pneumonitis  We will start her on burst steroids 40 mg for 5 days, we will get a pulmonary function test and COVID test after she completes her steroids. She has been given information on pneumonitis, causes, treatment. 2. Pulmonary nodule  Recent biopsy demonstrates chronic pneumonitis, we will need to continue surveillance and repeat CT chest with high resolution in 6 months    3. Preop testing  She is aware she will need to self isolate between her COVID testing and her PFT testing, she states she does not feel that will be a problem as she limits her social interaction to immediate family members she is living with. Central scheduling will call her with the dates for both her COVID test and her PFT test    4. Smoker  Quit smoking 6/26/2020, pack history 32+ years, will need continued CT surveillance we will get pulmonary function test which she is aware of the need for preprocedure COVID testing,     5. Healthcare maintenance  We have discussed the need to maintain yearly flu immunization, pneumococcal vaccination. We have discussed Coronavirus precaution including: social distancing when needing to be in public, handwashing practice, wiping items touched in public such as gas pumps, door handles, shopping carts, etc. Self monitoring for infection - fever, chills, cough, SOB. Should they develop symptoms they should call office for further instructions. Return in about 6 months (around 3/1/2021) for CT Chest, PFT. This dictation was performed with a verbal recognition program and it was checked for errors. It is possible that there are still dictated errors within this office note. Any errors should be brought immediately to my attention for correction. All efforts were made to ensure that this office note is accurate.        Electronically signed by SPENCER Zarate CNP on 9/1/2020 at 7:20 PM

## 2020-10-06 ENCOUNTER — VIRTUAL VISIT (OUTPATIENT)
Dept: PULMONOLOGY | Age: 55
End: 2020-10-06
Payer: COMMERCIAL

## 2020-10-06 PROBLEM — R05.8 PRODUCTIVE COUGH: Status: ACTIVE | Noted: 2020-10-06

## 2020-10-06 PROBLEM — J18.9 PNEUMONITIS: Status: ACTIVE | Noted: 2020-10-06

## 2020-10-06 PROBLEM — R06.02 SHORTNESS OF BREATH: Status: ACTIVE | Noted: 2020-10-06

## 2020-10-06 PROCEDURE — G8427 DOCREV CUR MEDS BY ELIG CLIN: HCPCS | Performed by: NURSE PRACTITIONER

## 2020-10-06 PROCEDURE — 3017F COLORECTAL CA SCREEN DOC REV: CPT | Performed by: NURSE PRACTITIONER

## 2020-10-06 PROCEDURE — 99213 OFFICE O/P EST LOW 20 MIN: CPT | Performed by: NURSE PRACTITIONER

## 2020-10-06 RX ORDER — DOXYCYCLINE HYCLATE 100 MG
100 TABLET ORAL 2 TIMES DAILY
Qty: 14 TABLET | Refills: 0 | Status: SHIPPED | OUTPATIENT
Start: 2020-10-06 | End: 2020-10-13

## 2020-10-06 RX ORDER — GUAIFENESIN 600 MG/1
600 TABLET, EXTENDED RELEASE ORAL 2 TIMES DAILY
Qty: 30 TABLET | Refills: 0 | Status: SHIPPED | OUTPATIENT
Start: 2020-10-06 | End: 2020-10-21

## 2020-10-06 RX ORDER — PREDNISONE 20 MG/1
20 TABLET ORAL 2 TIMES DAILY
Qty: 10 TABLET | Refills: 0 | Status: SHIPPED | OUTPATIENT
Start: 2020-10-06 | End: 2020-10-11

## 2020-10-06 NOTE — PROGRESS NOTES
[place] [address] [facility]   Telephone Visit Note  Acute Visit      Emperatriz Blanco is a 50 y.o. female evaluated via my chart telephone visit on 10/6/2020. Consent:  Pursuant to emergency declaration under the [address] in the [employer], 1135 waiver authorization in the [facility] Act, this telephone visit was completed with patient's consent, to reduce the patient's risk of exposure to COVID-19 and provide necessary medical care. She and/or health care decision maker is aware that that she may receive a bill for this telephone service, depending on her insurance coverage, and has provided verbal consent to proceed. Patient is at his residency and Provider is currently in Pulmonary Clinic at [place] [address] [facility]. Documentation:  Terence Jacome states she has a severe cough for the past 2 to 3 days. She states cough is productive with light green thick sputum. She states she has been following her temperature and has not had elevated or decreased temperature. She states her temperature this morning was 97 8. She denies any chills. She states her cough is deep, states she will have coughing spells lasting more than 5 minutes. States she has had posttussive emesis with prolonged cough. She states her cough is worse at night when she is laying down. And in the morning when she first gets off sputum it appears thicker. She states she has been drinking approximately 24 ounces of fluid a day. I last saw Terence Jacome 9/1/2020 at which time she was placed on steroids for symptoms pneumonitis which was found on CT scan which was completed on 6/9/2020 following complaints of shortness of breath pleuritic right upper back pain and history of DVT. CT scan at that time demonstrated numerous masslike irregular nodular opacities throughout both lungs.   Thought to be an infectious or inflammatory process but strongly recommended follow-up to ensure resolution. Past Medical History:   Diagnosis Date    [facility] venous thrombosis) ([facility])     Hx of blood clots 2015    DVT right leg    Hypertension     Kidney stone     PAD (peripheral artery disease) ([facility]) 10/26/2015         Current Outpatient Medications:     guaiFENesin (MUCINEX) 600 MG extended release tablet, Take 1 tablet by mouth 2 times daily for 15 days, Disp: 30 tablet, Rfl: 0    predniSONE (DELTASONE) 20 MG tablet, Take 1 tablet by mouth 2 times daily for 5 days, Disp: 10 tablet, Rfl: 0    doxycycline hyclate (VIBRA-TABS) 100 MG tablet, Take 1 tablet by mouth 2 times daily for 7 days, Disp: 14 tablet, Rfl: 0    oxybutynin (DITROPAN) 5 MG tablet, Take 5 mg by mouth 2 times daily, Disp: , Rfl:     tiZANidine (ZANAFLEX) 4 MG tablet, Take 1 tablet by mouth 3 times daily as needed, Disp: , Rfl:     cilostazol (PLETAL) 50 MG tablet, Take 50 mg by mouth daily, Disp: , Rfl:     sertraline (ZOLOFT) 100 MG tablet, Take 100 mg by mouth daily, Disp: , Rfl:     Compression Stockings MISC, by Does not apply route Do not wear for more than 8 hours per day. Please fit and supply thigh high compression stockings, Disp: 1 each, Rfl: 0    gabapentin (NEURONTIN) 300 MG capsule, Take two tablets po tid (Patient taking differently: Take 600 mg by mouth 3 times daily. Take two tablets po tid), Disp: 90 capsule, Rfl: 0    OLANZapine zydis (ZYPREXA) 5 MG disintegrating tablet, Take 10 mg by mouth nightly , Disp: , Rfl:     busPIRone (BUSPAR) 10 MG tablet, Take 15 mg by mouth 2 times daily , Disp: , Rfl:     traZODone (DESYREL) 50 MG tablet, Take 100 mg by mouth nightly as needed for Sleep., Disp: , Rfl:     aspirin 81 MG tablet, Take 81 mg by mouth daily. , Disp: , Rfl:     clopidogrel (PLAVIX) 75 MG tablet, Take 75 mg by mouth daily. , Disp: , Rfl:     metoprolol (LOPRESSOR) 25 MG tablet, Take 25 mg by mouth 2 times daily.   , Disp: , Rfl:     ROS:   Constitutional: Denies fever, denies chills Cardiovascular: Positive chest pain with prolonged coughing spells, denies palpitations, denies swelling of extremities. Pulmonary: Positive productive cough, positive SOB following long coughing spell, denies wheeze  Musculoskeletal:  Denies malaise,     Psychiatric/Behavioral: Denies for dysphoric mood    Physical exam not complete due to telephone visit  No conversational dyspnea noted during telephone assessment  Deep cough was noted. Diagnosis:    ICD-10-CM    1. Pulmonary nodules  R91.8 Low Dose Chest CT -Abnormal Lung Screen Follow up   2. Pneumonitis  J18.9 Low Dose Chest CT -Abnormal Lung Screen Follow up     predniSONE (DELTASONE) 20 MG tablet     doxycycline hyclate (VIBRA-TABS) 100 MG tablet   3. Productive cough  R05 guaiFENesin (MUCINEX) 600 MG extended release tablet   4. Shortness of breath  R06.02           Plan:  I will start Filomena on doxycycline and steroid burst.  I will give her Mucinex to help her with her cough and have encouraged her to increase her oral fluids to at least 64 ounces a day. She is to call the office on Friday to let me know how she has had any symptom improvement. We have discussed the need to maintain yearly flu immunization, pneumococcal vaccination. We have discussed Coronavirus precaution including handwashing practice, wiping items touched in public such as gas pumps, door handles, shopping carts, etc. Self monitoring for infection - fever, chills, cough, SOB. Should he develop symptoms he should call office for further instructions. I affirm this is a Patient initiated episode with an established patient who has not had a related appointment within my department in the past 7 days or scheduled within the next 24 hours.     Total Time: 19 minutes    Note: not billable if this call serves to triage the patient into an appointment for the relevant concern      Aidee Winters

## 2020-10-29 ENCOUNTER — VIRTUAL VISIT (OUTPATIENT)
Dept: PULMONOLOGY | Age: 55
End: 2020-10-29
Payer: COMMERCIAL

## 2020-10-29 PROCEDURE — 3017F COLORECTAL CA SCREEN DOC REV: CPT | Performed by: NURSE PRACTITIONER

## 2020-10-29 PROCEDURE — 99214 OFFICE O/P EST MOD 30 MIN: CPT | Performed by: NURSE PRACTITIONER

## 2020-10-29 PROCEDURE — G8427 DOCREV CUR MEDS BY ELIG CLIN: HCPCS | Performed by: NURSE PRACTITIONER

## 2020-10-29 RX ORDER — PREDNISONE 20 MG/1
20 TABLET ORAL 2 TIMES DAILY
Qty: 10 TABLET | Refills: 0 | Status: SHIPPED | OUTPATIENT
Start: 2020-10-29 | End: 2020-11-03

## 2020-10-29 RX ORDER — GUAIFENESIN/DEXTROMETHORPHAN 100-10MG/5
5 SYRUP ORAL 3 TIMES DAILY PRN
Qty: 120 ML | Refills: 0 | Status: SHIPPED | OUTPATIENT
Start: 2020-10-29 | End: 2020-11-08

## 2020-10-29 RX ORDER — AMOXICILLIN AND CLAVULANATE POTASSIUM 500; 125 MG/1; MG/1
1 TABLET, FILM COATED ORAL 3 TIMES DAILY
Qty: 30 TABLET | Refills: 0 | Status: SHIPPED | OUTPATIENT
Start: 2020-10-29 | End: 2020-11-08

## 2020-10-29 NOTE — PROGRESS NOTES
[place] [address] [facility]   Telephone Visit Note      Mendel Kings is a 50 y.o. female evaluated via my chart telephone visit on 10/29/2020. Consent:  Pursuant to emergency declaration under the [employer] in the [employer], 1135 waiver authorization in the [facility] Act, this telephone visit was completed with patient's consent, to reduce the patient's risk of exposure to COVID-19 and provide necessary medical care. She and/or health care decision maker is aware that that she may receive a bill for this telephone service, depending on her insurance coverage, and has provided verbal consent to proceed. Patient is at his residency and Provider is currently in Pulmonary Clinic at [address]. Documentation:  Doc Thakur with past medical history significant for pulmonitis and pulmonary lung nodules, states she has had over a week of increased cough, chest congestion, sinus pressure, sinus drainage, sore throat. She states she has tried multiple over-the-counter products without relief. Sputum production is thick, white to creamy, foul tasting. She denies any fever or chills. She denies any recent ill contacts. She denies any headache, lightheadedness, change in sensation of taste or smell. She denies any contact with persons of known Covid exposure or under investigation for Covid exposure.     Past Medical History:   Diagnosis Date    [facility] thrombosis) ([facility])     Hx of blood clots 2015    DVT right leg    Hypertension     Kidney stone     PAD (peripheral artery disease) ([facility]) 10/26/2015         Current Outpatient Medications:     oxybutynin (DITROPAN) 5 MG tablet, Take 5 mg by mouth 2 times daily, Disp: , Rfl:     tiZANidine (ZANAFLEX) 4 MG tablet, Take 1 tablet by mouth 3 times daily as needed, Disp: , Rfl:     cilostazol (PLETAL) 50 MG tablet, Take 50 mg by mouth daily, Disp: , Rfl:     sertraline (ZOLOFT) 100 MG tablet, Take 100 mg by mouth daily, Disp: , Rfl:     Compression Stockings MISC, by Does not apply route Do not wear for more than 8 hours per day. Please fit and supply thigh high compression stockings, Disp: 1 each, Rfl: 0    gabapentin (NEURONTIN) 300 MG capsule, Take two tablets po tid (Patient taking differently: Take 600 mg by mouth 3 times daily. Take two tablets po tid), Disp: 90 capsule, Rfl: 0    OLANZapine zydis (ZYPREXA) 5 MG disintegrating tablet, Take 10 mg by mouth nightly , Disp: , Rfl:     busPIRone (BUSPAR) 10 MG tablet, Take 15 mg by mouth 2 times daily , Disp: , Rfl:     traZODone (DESYREL) 50 MG tablet, Take 100 mg by mouth nightly as needed for Sleep., Disp: , Rfl:     aspirin 81 MG tablet, Take 81 mg by mouth daily. , Disp: , Rfl:     clopidogrel (PLAVIX) 75 MG tablet, Take 75 mg by mouth daily. , Disp: , Rfl:     metoprolol (LOPRESSOR) 25 MG tablet, Take 25 mg by mouth 2 times daily. , Disp: , Rfl:     ROS:   Constitutional: Denies fever, denies chills   HEENT: Positive sinus pressure, positive sinus drainage, positive sore throat  Cardiovascular: Denies chest pain, denies palpitations. Pulmonary: Positive cough, denies SOB, denies wheeze  Musculoskeletal:  Denies malaise, denies joint pain  Psychiatric/Behavioral: Negative for dysphoric mood    Physical exam not completes due to telephone visit       Diagnosis:    ICD-10-CM    1. Cough  R05    2. Pneumonitis  J18.9    3. Acute recurrent maxillary sinusitis  J01.01    4. Preop testing  Z01.818    5. Smoker  F17.200    6. Pulmonary nodules  R91.8           Plan:  I discussed with Filomena about symptoms. She states she feels she has the \"tightness in her chest with breathing\" like she has had in the past when her pneumonitis has flared. I will start her on a steroid burst for 5 days 40 mg and have instructed her how to take medication.   She has tenderness when she palpates her upper cheeks impinges across the bridge of her nose along with sinus drainage, sinus pressure and sore throat so I have strong suspicions that she may have recurrent maxillary sinusitis which I have treated for in the past.  I will start her on Augmentin for 7 days. I also will call in guaifenesin DM syrup for her cough. Review of her chart demonstrates that she has not completed her pulmonary function test.  I will reorder her pulmonary function test and a Covid test and have instructed her to have testing completed 1 week after she completes her antibiotics. I have requested that she call the office on Monday to let me know how she is feeling. If she is having more symptoms for shortness of breath chest pain she is to go to emergency room for further evaluation. We have discussed the need to maintain yearly flu immunization, pneumococcal vaccination. We have discussed Coronavirus precaution including handwashing practice, wiping items touched in public such as gas pumps, door handles, shopping carts, etc. Self monitoring for infection - fever, chills, cough, SOB. Should he develop symptoms he should call office for further instructions. I affirm this is a Patient initiated episode with an established patient who has not had a related appointment within my department in the past 7 days or scheduled within the next 24 hours.     Total Time: 20 minutes    Note: not billable if this call serves to triage the patient into an appointment for the relevant concern      Nasima Henriquez

## (undated) DEVICE — Z DISCONTINUED NO SUB IDED TUBING ETCO2 AD L6.5FT NSL ORAL CVD PRNG NONFLARED TIP OVR

## (undated) DEVICE — AIRLIFE™ NASAL OXYGEN CANNULA CURVED, NONFLARED TIP, WITH 7' FEET (2.1 M) CRUSH RESISTANT TUBING, OVER-THE-EAR STYLE: Brand: AIRLIFE™